# Patient Record
Sex: FEMALE | Race: WHITE | Employment: FULL TIME | URBAN - METROPOLITAN AREA
[De-identification: names, ages, dates, MRNs, and addresses within clinical notes are randomized per-mention and may not be internally consistent; named-entity substitution may affect disease eponyms.]

---

## 2018-10-08 ENCOUNTER — HOSPITAL ENCOUNTER (OUTPATIENT)
Age: 28
Discharge: HOME | End: 2018-10-10

## 2018-10-08 PROCEDURE — 87591 N.GONORRHOEAE DNA AMP PROB: CPT

## 2018-10-08 PROCEDURE — 88175 CYTOPATH C/V AUTO FLUID REDO: CPT

## 2018-10-08 PROCEDURE — 87491 CHLMYD TRACH DNA AMP PROBE: CPT

## 2018-10-11 LAB
CHLAMYDIA TRACHOMATIS AMPLIFIED DET: NORMAL
N GONORRHOEAE AMPLIFIED DET: NORMAL

## 2021-11-23 ENCOUNTER — HOSPITAL ENCOUNTER (OUTPATIENT)
Age: 31
Setting detail: OBSERVATION
Discharge: LEFT BEFORE DISCHARGE | End: 2021-11-25
Attending: OBSTETRICS & GYNECOLOGY | Admitting: OBSTETRICS & GYNECOLOGY
Payer: COMMERCIAL

## 2021-11-23 ENCOUNTER — ANCILLARY PROCEDURE (OUTPATIENT)
Dept: OBGYN CLINIC | Age: 31
End: 2021-11-23
Payer: COMMERCIAL

## 2021-11-23 PROBLEM — O16.3 ELEVATED BLOOD PRESSURE AFFECTING PREGNANCY IN THIRD TRIMESTER, ANTEPARTUM: Status: ACTIVE | Noted: 2021-11-23

## 2021-11-23 LAB
ABO/RH: NORMAL
ALBUMIN SERPL-MCNC: 3.6 G/DL (ref 3.5–5.2)
ALP BLD-CCNC: 110 U/L (ref 35–104)
ALT SERPL-CCNC: 54 U/L (ref 0–32)
AMPHETAMINE SCREEN, URINE: NOT DETECTED
ANION GAP SERPL CALCULATED.3IONS-SCNC: 14 MMOL/L (ref 7–16)
ANTIBODY SCREEN: NORMAL
APTT: 26.9 SEC (ref 24.5–35.1)
AST SERPL-CCNC: 36 U/L (ref 0–31)
BACTERIA: ABNORMAL /HPF
BARBITURATE SCREEN URINE: NOT DETECTED
BASOPHILS ABSOLUTE: 0.03 E9/L (ref 0–0.2)
BASOPHILS RELATIVE PERCENT: 0.3 % (ref 0–2)
BENZODIAZEPINE SCREEN, URINE: NOT DETECTED
BILIRUB SERPL-MCNC: 0.4 MG/DL (ref 0–1.2)
BILIRUBIN URINE: NEGATIVE
BLOOD, URINE: NEGATIVE
BUN BLDV-MCNC: 5 MG/DL (ref 6–20)
CALCIUM SERPL-MCNC: 9.3 MG/DL (ref 8.6–10.2)
CANNABINOID SCREEN URINE: NOT DETECTED
CHLORIDE BLD-SCNC: 101 MMOL/L (ref 98–107)
CLARITY: CLEAR
CO2: 20 MMOL/L (ref 22–29)
COCAINE METABOLITE SCREEN URINE: NOT DETECTED
COLOR: YELLOW
CREAT SERPL-MCNC: 0.7 MG/DL (ref 0.5–1)
EOSINOPHILS ABSOLUTE: 0.1 E9/L (ref 0.05–0.5)
EOSINOPHILS RELATIVE PERCENT: 1 % (ref 0–6)
EPITHELIAL CELLS, UA: ABNORMAL /HPF
FENTANYL SCREEN, URINE: NOT DETECTED
FIBRINOGEN: 668 MG/DL (ref 225–540)
GFR AFRICAN AMERICAN: >60
GFR NON-AFRICAN AMERICAN: >60 ML/MIN/1.73
GLUCOSE BLD-MCNC: 89 MG/DL (ref 74–99)
GLUCOSE URINE: NEGATIVE MG/DL
HCT VFR BLD CALC: 28.4 % (ref 34–48)
HEMOGLOBIN: 10 G/DL (ref 11.5–15.5)
IMMATURE GRANULOCYTES #: 0.06 E9/L
IMMATURE GRANULOCYTES %: 0.6 % (ref 0–5)
INR BLD: 0.9
KETONES, URINE: 15 MG/DL
LEUKOCYTE ESTERASE, URINE: NEGATIVE
LYMPHOCYTES ABSOLUTE: 1.98 E9/L (ref 1.5–4)
LYMPHOCYTES RELATIVE PERCENT: 19.8 % (ref 20–42)
Lab: NORMAL
MCH RBC QN AUTO: 32.5 PG (ref 26–35)
MCHC RBC AUTO-ENTMCNC: 35.2 % (ref 32–34.5)
MCV RBC AUTO: 92.2 FL (ref 80–99.9)
METHADONE SCREEN, URINE: NOT DETECTED
MONOCYTES ABSOLUTE: 0.72 E9/L (ref 0.1–0.95)
MONOCYTES RELATIVE PERCENT: 7.2 % (ref 2–12)
NEUTROPHILS ABSOLUTE: 7.11 E9/L (ref 1.8–7.3)
NEUTROPHILS RELATIVE PERCENT: 71.1 % (ref 43–80)
NITRITE, URINE: NEGATIVE
OPIATE SCREEN URINE: NOT DETECTED
OXYCODONE URINE: NOT DETECTED
PDW BLD-RTO: 13.2 FL (ref 11.5–15)
PH UA: 7 (ref 5–9)
PHENCYCLIDINE SCREEN URINE: NOT DETECTED
PLATELET # BLD: 223 E9/L (ref 130–450)
PMV BLD AUTO: 12.4 FL (ref 7–12)
POTASSIUM SERPL-SCNC: 3.2 MMOL/L (ref 3.5–5)
PROTEIN UA: NEGATIVE MG/DL
PROTHROMBIN TIME: 9.6 SEC (ref 9.3–12.4)
RBC # BLD: 3.08 E12/L (ref 3.5–5.5)
RBC UA: ABNORMAL /HPF (ref 0–2)
SARS-COV-2, NAAT: NOT DETECTED
SODIUM BLD-SCNC: 135 MMOL/L (ref 132–146)
SPECIFIC GRAVITY UA: <=1.005 (ref 1–1.03)
TOTAL PROTEIN: 6.4 G/DL (ref 6.4–8.3)
URIC ACID, SERUM: 3.9 MG/DL (ref 2.4–5.7)
UROBILINOGEN, URINE: 0.2 E.U./DL
WBC # BLD: 10 E9/L (ref 4.5–11.5)
WBC UA: ABNORMAL /HPF (ref 0–5)

## 2021-11-23 PROCEDURE — 76811 OB US DETAILED SNGL FETUS: CPT | Performed by: OBSTETRICS & GYNECOLOGY

## 2021-11-23 PROCEDURE — 96372 THER/PROPH/DIAG INJ SC/IM: CPT

## 2021-11-23 PROCEDURE — 96376 TX/PRO/DX INJ SAME DRUG ADON: CPT

## 2021-11-23 PROCEDURE — 85384 FIBRINOGEN ACTIVITY: CPT

## 2021-11-23 PROCEDURE — 84550 ASSAY OF BLOOD/URIC ACID: CPT

## 2021-11-23 PROCEDURE — 80053 COMPREHEN METABOLIC PANEL: CPT

## 2021-11-23 PROCEDURE — 96365 THER/PROPH/DIAG IV INF INIT: CPT

## 2021-11-23 PROCEDURE — 85025 COMPLETE CBC W/AUTO DIFF WBC: CPT

## 2021-11-23 PROCEDURE — 6360000002 HC RX W HCPCS: Performed by: OBSTETRICS & GYNECOLOGY

## 2021-11-23 PROCEDURE — 36415 COLL VENOUS BLD VENIPUNCTURE: CPT

## 2021-11-23 PROCEDURE — 80307 DRUG TEST PRSMV CHEM ANLYZR: CPT

## 2021-11-23 PROCEDURE — 87088 URINE BACTERIA CULTURE: CPT

## 2021-11-23 PROCEDURE — 6370000000 HC RX 637 (ALT 250 FOR IP): Performed by: OBSTETRICS & GYNECOLOGY

## 2021-11-23 PROCEDURE — 99203 OFFICE O/P NEW LOW 30 MIN: CPT | Performed by: OBSTETRICS & GYNECOLOGY

## 2021-11-23 PROCEDURE — 86850 RBC ANTIBODY SCREEN: CPT

## 2021-11-23 PROCEDURE — 86900 BLOOD TYPING SEROLOGIC ABO: CPT

## 2021-11-23 PROCEDURE — 81001 URINALYSIS AUTO W/SCOPE: CPT

## 2021-11-23 PROCEDURE — 85730 THROMBOPLASTIN TIME PARTIAL: CPT

## 2021-11-23 PROCEDURE — 76820 UMBILICAL ARTERY ECHO: CPT | Performed by: OBSTETRICS & GYNECOLOGY

## 2021-11-23 PROCEDURE — 85610 PROTHROMBIN TIME: CPT

## 2021-11-23 PROCEDURE — 87635 SARS-COV-2 COVID-19 AMP PRB: CPT

## 2021-11-23 PROCEDURE — 86901 BLOOD TYPING SEROLOGIC RH(D): CPT

## 2021-11-23 PROCEDURE — 2580000003 HC RX 258: Performed by: OBSTETRICS & GYNECOLOGY

## 2021-11-23 PROCEDURE — 96366 THER/PROPH/DIAG IV INF ADDON: CPT

## 2021-11-23 PROCEDURE — 76819 FETAL BIOPHYS PROFIL W/O NST: CPT | Performed by: OBSTETRICS & GYNECOLOGY

## 2021-11-23 PROCEDURE — 99213 OFFICE O/P EST LOW 20 MIN: CPT | Performed by: ADVANCED PRACTICE MIDWIFE

## 2021-11-23 RX ORDER — BETAMETHASONE SODIUM PHOSPHATE AND BETAMETHASONE ACETATE 3; 3 MG/ML; MG/ML
12 INJECTION, SUSPENSION INTRA-ARTICULAR; INTRALESIONAL; INTRAMUSCULAR; SOFT TISSUE ONCE
Status: COMPLETED | OUTPATIENT
Start: 2021-11-23 | End: 2021-11-23

## 2021-11-23 RX ORDER — MAGNESIUM SULFATE HEPTAHYDRATE 40 MG/ML
4000 INJECTION, SOLUTION INTRAVENOUS ONCE
Status: COMPLETED | OUTPATIENT
Start: 2021-11-23 | End: 2021-11-23

## 2021-11-23 RX ORDER — SODIUM CHLORIDE, SODIUM LACTATE, POTASSIUM CHLORIDE, CALCIUM CHLORIDE 600; 310; 30; 20 MG/100ML; MG/100ML; MG/100ML; MG/100ML
INJECTION, SOLUTION INTRAVENOUS CONTINUOUS
Status: DISCONTINUED | OUTPATIENT
Start: 2021-11-23 | End: 2021-11-25 | Stop reason: HOSPADM

## 2021-11-23 RX ORDER — LABETALOL 100 MG/1
50 TABLET, FILM COATED ORAL EVERY 12 HOURS SCHEDULED
Status: DISCONTINUED | OUTPATIENT
Start: 2021-11-23 | End: 2021-11-25

## 2021-11-23 RX ADMIN — BETAMETHASONE SODIUM PHOSPHATE AND BETAMETHASONE ACETATE 12 MG: 3; 3 INJECTION, SUSPENSION INTRA-ARTICULAR; INTRALESIONAL; INTRAMUSCULAR; SOFT TISSUE at 18:15

## 2021-11-23 RX ADMIN — MAGNESIUM SULFATE IN WATER 4000 MG: 40 INJECTION, SOLUTION INTRAVENOUS at 19:41

## 2021-11-23 RX ADMIN — MAGNESIUM SULFATE HEPTAHYDRATE 2000 MG/HR: 40 INJECTION, SOLUTION INTRAVENOUS at 20:22

## 2021-11-23 RX ADMIN — LABETALOL HYDROCHLORIDE 50 MG: 100 TABLET, FILM COATED ORAL at 22:12

## 2021-11-23 RX ADMIN — SODIUM CHLORIDE, POTASSIUM CHLORIDE, SODIUM LACTATE AND CALCIUM CHLORIDE: 600; 310; 30; 20 INJECTION, SOLUTION INTRAVENOUS at 21:49

## 2021-11-23 ASSESSMENT — PAIN SCALES - GENERAL: PAINLEVEL_OUTOF10: 0

## 2021-11-23 NOTE — H&P
CHIEF COMPLAINT:  elevated blood pressure    HISTORY OF PRESENT ILLNESS:      The patient is a 32 y.o. female at 27w9d. OB History        1    Para        Term                AB        Living           SAB        IAB        Ectopic        Molar        Multiple        Live Births                Patient presents with a chief complaint as above and is being admitted for gestational hypertension rule out pre-eclampsia    Estimated Due Date: Estimated Date of Delivery: 22    PRENATAL CARE:    Complicated by: chronic hypertension    PAST OB HISTORY  OB History        1    Para        Term                AB        Living           SAB        IAB        Ectopic        Molar        Multiple        Live Births                    Past Medical History:    No past medical history on file. Past Surgical History:    No past surgical history on file. Allergies:  Patient has no known allergies. Social History:    Social History     Socioeconomic History    Marital status: Single     Spouse name: Not on file    Number of children: Not on file    Years of education: Not on file    Highest education level: Not on file   Occupational History    Not on file   Tobacco Use    Smoking status: Current Every Day Smoker     Packs/day: 0.50    Smokeless tobacco: Never Used   Substance and Sexual Activity    Alcohol use: No    Drug use: No    Sexual activity: Yes   Other Topics Concern    Not on file   Social History Narrative    Not on file     Social Determinants of Health     Financial Resource Strain:     Difficulty of Paying Living Expenses: Not on file   Food Insecurity:     Worried About Running Out of Food in the Last Year: Not on file    Jonny of Food in the Last Year: Not on file   Transportation Needs:     Lack of Transportation (Medical): Not on file    Lack of Transportation (Non-Medical):  Not on file   Physical Activity:     Days of Exercise per Week: Not on file    Minutes of Exercise per Session: Not on file   Stress:     Feeling of Stress : Not on file   Social Connections:     Frequency of Communication with Friends and Family: Not on file    Frequency of Social Gatherings with Friends and Family: Not on file    Attends [employer] Services: Not on file    Active Member of Clubs or Organizations: Not on file    Attends Club or Organization Meetings: Not on file    Marital Status: Not on file   Intimate Partner Violence:     Fear of Current or Ex-Partner: Not on file    Emotionally Abused: Not on file    Physically Abused: Not on file    Sexually Abused: Not on file   Housing Stability:     Unable to Pay for Housing in the Last Year: Not on file    Number of [place] in the Last Year: Not on file    Unstable Housing in the Last Year: Not on file     Family History:   No family history on file. Medications Prior to Admission:  Medications Prior to Admission: Prenat-FeAsp-Meth-FA-DHA w/o A (PRENATE DHA) 18-0.6-0.4-300 MG CAPS, Take 1 tablet by mouth daily    REVIEW OF SYSTEMS:    CONSTITUTIONAL:  negative  RESPIRATORY:  negative  CARDIOVASCULAR:  negative  GASTROINTESTINAL:  negative  ALLERGIC/IMMUNOLOGIC:  negative  NEUROLOGICAL:  negative  BEHAVIOR/PSYCH:  negative    PHYSICAL EXAM:  Vitals:    11/23/21 1658 11/23/21 1757   BP: (!) 144/87 (!) 158/86   Pulse: 88 96   Resp: 16    Temp: 98.6 °F (37 °C)    TempSrc: Oral      General appearance:  awake, alert, cooperative, no apparent distress, and appears stated age  Neurologic:  Awake, alert, oriented to name, place and time. Lungs:  No increased work of breathing, good air exchange  Abdomen:  Soft, non tender, gravid, consistent with her gestational age,  Fetal heart rate:  Reassuring.   Pelvis:  Adequate pelvis  Cervix: deferred  Contraction frequency:  none    Membranes:  Intact    ASSESSMENT AND PLAN:      Other: Dr Turner Aragon called in with orders  MFM consult, pi labs urine, celestone

## 2021-11-23 NOTE — CONSULTS
Fredy Alcantar MD  [address] [facility], [address] [place] [address]     RE:  Tiny Liao  : 1990   AGE: 32 y.o. This report has been created using voice recognition software. It may contain errors which are inherent in voice recognition technology. Dear Dr. Ally Burgos:    Dallis Harada had a consultation today for the following indications:    Patient Active Problem List   Diagnosis    Elevated blood pressure affecting pregnancy in third trimester, antepartum    30 weeks gestation of pregnancy       Dallis Harada is a 32 y.o. female, who is G1(0). She has an Estimated Date of Delivery: 22 based on her previous ultrasound assessment. She is currently 30 weeks 6 days gestation based on that assessment. The patient was admitted for evaluation and management from Dr. Sunshine Romero office secondary to hypertension. The patient stated that her blood pressure in the office was 168/109. She had no prior history of hypertension prior to pregnancy. She stated that her blood pressures have been elevated for approximately 1 month but more more significantly elevated today. She had no symptoms of hypertension. She has had no vaginal bleeding or leaking of amniotic fluid. She had no complaint of abdominal pain or tenderness. She has had increased amount of lower extremity edema in the past month. The fetal heart rate tracing has been reassuring with moderate variability and accelerations. The patient recently traveled to [facility].  She stated that she had a negative Covid antibody screen when returning to the [place]. The patient's liver enzymes were slightly elevated today. Her platelet count was within normal limits. She has no history of liver disease. Her highest recorded blood pressure in the hospital was 158/88. She had no symptomatology related to hypertension.     A fetal ultrasound evaluation was performed and a detailed report included in the EMR under the cervical LEEP / conization /cryosurgery. Negative for uterine surgery. Negative for ovarian or tubal surgery. No past medical history on file. No past surgical history on file. No Known Allergies      Current Facility-Administered Medications:     magnesium sulfate 4000 mg in 100 mL IVPB premix, 4,000 mg, IntraVENous, Once, Daleen Brittle, MD    magnesium sulfate (25601 mg/500mL infusion), 2,000 mg/hr, IntraVENous, Continuous, Daleen Brittle, MD    Social History     Tobacco Use    Smoking status: Current Every Day Smoker     Packs/day: 0.50    Smokeless tobacco: Never Used   Substance Use Topics    Alcohol use: No       FAMILY MEDICAL HISTORY:   Negative for congenital abnormalities, autism, genetic disease and mental retardation, not listed above. Review of Systems :   CONSTITUTIONAL : No fever, no chills   HEENT : No headache, no visual changes, no rhinorrhea, no sore throat   CARDIOVASCULAR : No pain, no palpitations, no edema   RESPIRATORY : No pain, no shortness of breath   GASTROINTESTINAL : No N/V, no D/C, no abdominal pain   GENITOURINARY : No dysuria, hematuria and no incontinence   MUSCULOSKELETAL : No myalgia, No back pain  NEUROLOGICAL : No numbness, no tingling, no tremors. No history of seizures  ALL OTHER SYSTEMS WERE REPORTED AS NEGATIVE. PERTINENT PHYSICAL EXAMINATION:   Patient Vitals for the past 24 hrs:   BP Temp Temp src Pulse Resp   11/23/21 1828 (!) 146/87 -- -- 87 --   11/23/21 1812 (!) 150/88 -- -- 90 --   11/23/21 1757 (!) 158/86 -- -- 96 --   11/23/21 1658 (!) 144/87 98.6 °F (37 °C) Oral 88 16     GENERAL:   The patient is a well developed, female who is alert cooperative and oriented times three in no acute distress. HEENT:  Normo cephalic and atraumatic. No facial edema. ABDOMEN:   Her uterus is gravid. She had no complaint of abdominal pain or tenderness.  The fetal heart rate is 148 bpm. The fetus is in the cephalic presentation which was confirmed by the ultrasound assessment. EXTREMITIES:  1+ peripheral edema is noted.        Admission on 11/23/2021   Component Date Value Ref Range Status    aPTT 11/23/2021 26.9  24.5 - 35.1 sec Final    Protime 11/23/2021 9.6  9.3 - 12.4 sec Final    INR 11/23/2021 0.9   Final    Fibrinogen 11/23/2021 668* 225 - 540 mg/dL Final    Sodium 11/23/2021 135  132 - 146 mmol/L Final    Potassium 11/23/2021 3.2* 3.5 - 5.0 mmol/L Final    Chloride 11/23/2021 101  98 - 107 mmol/L Final    CO2 11/23/2021 20* 22 - 29 mmol/L Final    Anion Gap 11/23/2021 14  7 - 16 mmol/L Final    Glucose 11/23/2021 89  74 - 99 mg/dL Final    BUN 11/23/2021 5* 6 - 20 mg/dL Final    CREATININE 11/23/2021 0.7  0.5 - 1.0 mg/dL Final    GFR Non- 11/23/2021 >60  >=60 mL/min/1.73 Final    GFR  11/23/2021 >60   Final    Calcium 11/23/2021 9.3  8.6 - 10.2 mg/dL Final    Total Protein 11/23/2021 6.4  6.4 - 8.3 g/dL Final    Albumin 11/23/2021 3.6  3.5 - 5.2 g/dL Final    Total Bilirubin 11/23/2021 0.4  0.0 - 1.2 mg/dL Final    Alkaline Phosphatase 11/23/2021 110* 35 - 104 U/L Final    ALT 11/23/2021 54* 0 - 32 U/L Final    AST 11/23/2021 36* 0 - 31 U/L Final    WBC 11/23/2021 10.0  4.5 - 11.5 E9/L Final    RBC 11/23/2021 3.08* 3.50 - 5.50 E12/L Final    Hemoglobin 11/23/2021 10.0* 11.5 - 15.5 g/dL Final    Hematocrit 11/23/2021 28.4* 34.0 - 48.0 % Final    MCV 11/23/2021 92.2  80.0 - 99.9 fL Final    MCH 11/23/2021 32.5  26.0 - 35.0 pg Final    MCHC 11/23/2021 35.2* 32.0 - 34.5 % Final    RDW 11/23/2021 13.2  11.5 - 15.0 fL Final    Platelets 06/14/1626 223  130 - 450 E9/L Final    MPV 11/23/2021 12.4* 7.0 - 12.0 fL Final    Neutrophils % 11/23/2021 71.1  43.0 - 80.0 % Final    Immature Granulocytes % 11/23/2021 0.6  0.0 - 5.0 % Final    Lymphocytes % 11/23/2021 19.8* 20.0 - 42.0 % Final    Monocytes % 11/23/2021 7.2  2.0 - 12.0 % Final    Eosinophils % 11/23/2021 1.0  0.0 - 6.0 % Final    Basophils % 11/23/2021 0.3  0.0 - 2.0 % Final    Neutrophils Absolute 11/23/2021 7.11  1.80 - 7.30 E9/L Final    Immature Granulocytes # 11/23/2021 0.06  E9/L Final    Lymphocytes Absolute 11/23/2021 1.98  1.50 - 4.00 E9/L Final    Monocytes Absolute 11/23/2021 0.72  0.10 - 0.95 E9/L Final    Eosinophils Absolute 11/23/2021 0.10  0.05 - 0.50 E9/L Final    Basophils Absolute 11/23/2021 0.03  0.00 - 0.20 E9/L Final    Color, UA 11/23/2021 Yellow  Straw/Yellow Final    Clarity, UA 11/23/2021 Clear  Clear Final    Glucose, Ur 11/23/2021 Negative  Negative mg/dL Final    Bilirubin Urine 11/23/2021 Negative  Negative Final    Ketones, Urine 11/23/2021 15* Negative mg/dL Final    Specific [place], UA 11/23/2021 <=1.005  1.005 - 1.030 Final    Blood, Urine 11/23/2021 Negative  Negative Final    pH, UA 11/23/2021 7.0  5.0 - 9.0 Final    Protein, UA 11/23/2021 Negative  Negative mg/dL Final    Urobilinogen, Urine 11/23/2021 0.2  <2.0 E.U./dL Final    Nitrite, Urine 11/23/2021 Negative  Negative Final    Leukocyte Esterase, Urine 11/23/2021 Negative  Negative Final    WBC, UA 11/23/2021 1-3  0 - 5 /HPF Final    RBC, UA 11/23/2021 NONE  0 - 2 /HPF Final    Epithelial Cells, UA 11/23/2021 FEW  /HPF Final    Bacteria, UA 11/23/2021 MODERATE* None Seen /HPF Final    Amphetamine Screen, Urine 11/23/2021 NOT DETECTED  Negative <1000 ng/mL Final    Barbiturate Screen, Ur 11/23/2021 NOT DETECTED  Negative < 200 ng/mL Final    Benzodiazepine Screen, Urine 11/23/2021 NOT DETECTED  Negative < 200 ng/mL Final    Cannabinoid Scrn, Ur 11/23/2021 NOT DETECTED  Negative < 50ng/mL Final    Cocaine Metabolite Screen, Urine 11/23/2021 NOT DETECTED  Negative < 300 ng/mL Final    Opiate Scrn, Ur 11/23/2021 NOT DETECTED  Negative < 300ng/mL Final    PCP Screen, Urine 11/23/2021 NOT DETECTED  Negative < 25 ng/mL Final    Methadone Screen, Urine 11/23/2021 NOT DETECTED  Negative <300 ng/mL Final    Oxycodone Urine 11/23/2021 NOT DETECTED  Negative <100 ng/mL Final    FENTANYL SCREEN, URINE 11/23/2021 NOT DETECTED  Negative <1 ng/mL Final    Drug Screen Comment: 11/23/2021 see below   Final    Uric Acid, Serum 11/23/2021 3.9  2.4 - 5.7 mg/dL Final       IMPRESSION:  1.  IUP at 30 weeks 6 days gestation based on her Estimated Date of Delivery: 1/26/22  2. Elevated blood pressure in the third trimester with no prior history of HTN  3. Possible urinary tract infection. Culture pending. 4.  Elevated liver enzymes 11/23/2021  5. Not vaccinated for COVID-19  6. [facility]  7. Taking 81 mg ASA daily  8. Category 1 FHR tracing    PLAN:  The patient is currently admitted for evaluation and management. Continuous fetal heart rate and uterine contraction monitoring should be utilized for now. DVT prophylaxis should be utilized throughout her admission. Celestone is being administered for acceleration of fetal organ maturity. Magnesium sulfate is being administered for fetal neuro protection. Laboratory testing will be repeated in the morning including a CMP, CBC, uric acid, APTT, PT/INR, fibrinogen, acute hepatitis panel, COVID-19 screen, and urine culture. Consultation with neonatology was requested. Further evaluation and management will be dependent on the results of the patient's testing and her clinical presentation. If you have any questions regarding her management, please contact me at your convenience and thank you for allowing me to participate in her care.     Sincerely,        Julianne Arechiga MD, MS, Madi Childress, CIARA, RDMS, RVT  Director [address] [place] [address]  [phone]

## 2021-11-24 ENCOUNTER — ANCILLARY PROCEDURE (OUTPATIENT)
Dept: OBGYN CLINIC | Age: 31
End: 2021-11-24
Payer: COMMERCIAL

## 2021-11-24 PROBLEM — Z34.90 NORMAL PREGNANCY, UNSPECIFIED TRIMESTER: Status: ACTIVE | Noted: 2021-11-24

## 2021-11-24 LAB
ALBUMIN SERPL-MCNC: 3.4 G/DL (ref 3.5–5.2)
ALP BLD-CCNC: 110 U/L (ref 35–104)
ALT SERPL-CCNC: 51 U/L (ref 0–32)
ANION GAP SERPL CALCULATED.3IONS-SCNC: 14 MMOL/L (ref 7–16)
APTT: 25.3 SEC (ref 24.5–35.1)
AST SERPL-CCNC: 38 U/L (ref 0–31)
BASOPHILS ABSOLUTE: 0.01 E9/L (ref 0–0.2)
BASOPHILS RELATIVE PERCENT: 0.1 % (ref 0–2)
BILIRUB SERPL-MCNC: 0.4 MG/DL (ref 0–1.2)
BUN BLDV-MCNC: 3 MG/DL (ref 6–20)
CALCIUM SERPL-MCNC: 7.9 MG/DL (ref 8.6–10.2)
CHLORIDE BLD-SCNC: 100 MMOL/L (ref 98–107)
CO2: 19 MMOL/L (ref 22–29)
CREAT SERPL-MCNC: 0.7 MG/DL (ref 0.5–1)
EOSINOPHILS ABSOLUTE: 0 E9/L (ref 0.05–0.5)
EOSINOPHILS RELATIVE PERCENT: 0 % (ref 0–6)
FIBRINOGEN: 667 MG/DL (ref 225–540)
GFR AFRICAN AMERICAN: >60
GFR NON-AFRICAN AMERICAN: >60 ML/MIN/1.73
GLUCOSE BLD-MCNC: 122 MG/DL (ref 74–99)
HAV IGM SER IA-ACNC: NORMAL
HCT VFR BLD CALC: 28.2 % (ref 34–48)
HEMOGLOBIN: 9.7 G/DL (ref 11.5–15.5)
HEPATITIS B CORE IGM ANTIBODY: NORMAL
HEPATITIS B SURFACE ANTIGEN INTERPRETATION: NORMAL
HEPATITIS C ANTIBODY INTERPRETATION: NORMAL
IMMATURE GRANULOCYTES #: 0.09 E9/L
IMMATURE GRANULOCYTES %: 0.6 % (ref 0–5)
INR BLD: 0.8
LYMPHOCYTES ABSOLUTE: 1.5 E9/L (ref 1.5–4)
LYMPHOCYTES RELATIVE PERCENT: 10.5 % (ref 20–42)
MCH RBC QN AUTO: 32.7 PG (ref 26–35)
MCHC RBC AUTO-ENTMCNC: 34.4 % (ref 32–34.5)
MCV RBC AUTO: 94.9 FL (ref 80–99.9)
MONOCYTES ABSOLUTE: 0.44 E9/L (ref 0.1–0.95)
MONOCYTES RELATIVE PERCENT: 3.1 % (ref 2–12)
NEUTROPHILS ABSOLUTE: 12.3 E9/L (ref 1.8–7.3)
NEUTROPHILS RELATIVE PERCENT: 85.7 % (ref 43–80)
PDW BLD-RTO: 13.2 FL (ref 11.5–15)
PLATELET # BLD: 229 E9/L (ref 130–450)
PMV BLD AUTO: 12.8 FL (ref 7–12)
POTASSIUM SERPL-SCNC: 3.1 MMOL/L (ref 3.5–5)
PROTHROMBIN TIME: 9.2 SEC (ref 9.3–12.4)
RBC # BLD: 2.97 E12/L (ref 3.5–5.5)
SODIUM BLD-SCNC: 133 MMOL/L (ref 132–146)
TOTAL PROTEIN: 6 G/DL (ref 6.4–8.3)
URIC ACID, SERUM: 4.5 MG/DL (ref 2.4–5.7)
WBC # BLD: 14.3 E9/L (ref 4.5–11.5)

## 2021-11-24 PROCEDURE — G0378 HOSPITAL OBSERVATION PER HR: HCPCS

## 2021-11-24 PROCEDURE — 96365 THER/PROPH/DIAG IV INF INIT: CPT

## 2021-11-24 PROCEDURE — 76819 FETAL BIOPHYS PROFIL W/O NST: CPT | Performed by: OBSTETRICS & GYNECOLOGY

## 2021-11-24 PROCEDURE — 85384 FIBRINOGEN ACTIVITY: CPT

## 2021-11-24 PROCEDURE — 85610 PROTHROMBIN TIME: CPT

## 2021-11-24 PROCEDURE — 96366 THER/PROPH/DIAG IV INF ADDON: CPT

## 2021-11-24 PROCEDURE — 80074 ACUTE HEPATITIS PANEL: CPT

## 2021-11-24 PROCEDURE — 85730 THROMBOPLASTIN TIME PARTIAL: CPT

## 2021-11-24 PROCEDURE — 99212 OFFICE O/P EST SF 10 MIN: CPT | Performed by: OBSTETRICS & GYNECOLOGY

## 2021-11-24 PROCEDURE — 6370000000 HC RX 637 (ALT 250 FOR IP): Performed by: OBSTETRICS & GYNECOLOGY

## 2021-11-24 PROCEDURE — 96372 THER/PROPH/DIAG INJ SC/IM: CPT

## 2021-11-24 PROCEDURE — 84550 ASSAY OF BLOOD/URIC ACID: CPT

## 2021-11-24 PROCEDURE — 85025 COMPLETE CBC W/AUTO DIFF WBC: CPT

## 2021-11-24 PROCEDURE — 36415 COLL VENOUS BLD VENIPUNCTURE: CPT

## 2021-11-24 PROCEDURE — 80053 COMPREHEN METABOLIC PANEL: CPT

## 2021-11-24 PROCEDURE — 6360000002 HC RX W HCPCS: Performed by: OBSTETRICS & GYNECOLOGY

## 2021-11-24 PROCEDURE — 76820 UMBILICAL ARTERY ECHO: CPT | Performed by: OBSTETRICS & GYNECOLOGY

## 2021-11-24 RX ORDER — ASPIRIN 81 MG/1
81 TABLET, CHEWABLE ORAL DAILY
Status: ON HOLD | COMMUNITY
End: 2022-01-08 | Stop reason: HOSPADM

## 2021-11-24 RX ORDER — BETAMETHASONE SODIUM PHOSPHATE AND BETAMETHASONE ACETATE 3; 3 MG/ML; MG/ML
12 INJECTION, SUSPENSION INTRA-ARTICULAR; INTRALESIONAL; INTRAMUSCULAR; SOFT TISSUE ONCE
Status: COMPLETED | OUTPATIENT
Start: 2021-11-24 | End: 2021-11-24

## 2021-11-24 RX ADMIN — BETAMETHASONE SODIUM PHOSPHATE AND BETAMETHASONE ACETATE 12 MG: 3; 3 INJECTION, SUSPENSION INTRA-ARTICULAR; INTRALESIONAL; INTRAMUSCULAR at 18:56

## 2021-11-24 RX ADMIN — LABETALOL HYDROCHLORIDE 50 MG: 100 TABLET, FILM COATED ORAL at 20:40

## 2021-11-24 RX ADMIN — LABETALOL HYDROCHLORIDE 50 MG: 100 TABLET, FILM COATED ORAL at 08:32

## 2021-11-24 RX ADMIN — MAGNESIUM SULFATE HEPTAHYDRATE 2000 MG/HR: 40 INJECTION, SOLUTION INTRAVENOUS at 03:12

## 2021-11-24 NOTE — PROGRESS NOTES
. 31W0D. Patient denies VB, LOF, and contractions. Patient reports good fetal movement. Denies any other complaints. EFM on. Call light within reach. Will continue to monitor.

## 2021-11-24 NOTE — H&P
CHIEF COMPLAINT:  elevated blood pressure    HISTORY OF PRESENT ILLNESS:      The patient is a 32 y.o. female at 31w0d. OB History        1    Para        Term                AB        Living           SAB        IAB        Ectopic        Molar        Multiple        Live Births                Patient presents with a chief complaint as above and is being admitted for gestational hypertension r/o pih     Estimated Due Date: Estimated Date of Delivery: 22    PRENATAL CARE:    Complicated by: gestational hypertension    PAST OB HISTORY  OB History        1    Para        Term                AB        Living           SAB        IAB        Ectopic        Molar        Multiple        Live Births                    Past Medical History:    No past medical history on file. Past Surgical History:    No past surgical history on file. Allergies:  Patient has no known allergies. Social History:    Social History     Socioeconomic History    Marital status: Single     Spouse name: Not on file    Number of children: Not on file    Years of education: Not on file    Highest education level: Not on file   Occupational History    Not on file   Tobacco Use    Smoking status: Current Every Day Smoker     Packs/day: 0.50    Smokeless tobacco: Never Used   Substance and Sexual Activity    Alcohol use: No    Drug use: No    Sexual activity: Yes   Other Topics Concern    Not on file   Social History Narrative    Not on file     Social Determinants of Health     Financial Resource Strain:     Difficulty of Paying Living Expenses: Not on file   Food Insecurity:     Worried About Running Out of Food in the Last Year: Not on file    Jonny of Food in the Last Year: Not on file   Transportation Needs:     Lack of Transportation (Medical): Not on file    Lack of Transportation (Non-Medical):  Not on file   Physical Activity:     Days of Exercise per Week: Not on file    Minutes of Exercise per Session: Not on file   Stress:     Feeling of Stress : Not on file   Social Connections:     Frequency of Communication with Friends and Family: Not on file    Frequency of Social Gatherings with Friends and Family: Not on file    Attends Taoism Services: Not on file    Active Member of Clubs or Organizations: Not on file    Attends Club or Organization Meetings: Not on file    Marital Status: Not on file   Intimate Partner Violence:     Fear of Current or Ex-Partner: Not on file    Emotionally Abused: Not on file    Physically Abused: Not on file    Sexually Abused: Not on file   Housing Stability:     Unable to Pay for Housing in the Last Year: Not on file    Number of [place] in the Last Year: Not on file    Unstable Housing in the Last Year: Not on file     Family History:   No family history on file. Medications Prior to Admission:  Medications Prior to Admission: aspirin 81 MG chewable tablet, Take 81 mg by mouth daily  Prenat-FeAsp-Meth-FA-DHA w/o A (PRENATE DHA) 18-0.6-0.4-300 MG CAPS, Take 1 tablet by mouth daily    REVIEW OF SYSTEMS:    CONSTITUTIONAL:  negative  RESPIRATORY:  negative  CARDIOVASCULAR:  negative  GASTROINTESTINAL:  negative  ALLERGIC/IMMUNOLOGIC:  negative  NEUROLOGICAL:  negative  BEHAVIOR/PSYCH:  negative    PHYSICAL EXAM:  Vitals:    11/24/21 0450 11/24/21 0550 11/24/21 0600 11/24/21 0650   BP: 135/75 (!) 141/89  (!) 142/80   Pulse: 103 104  104   Resp:  12     Temp:  97.7 °F (36.5 °C)     TempSrc:  Oral     SpO2: 96% 98%  96%   Weight:   194 lb (88 kg)    Height:   5' 7\" (1.702 m)      General appearance:  awake, alert, cooperative, no apparent distress, and appears stated age  Neurologic:  Awake, alert, oriented to name, place and time. Lungs:  No increased work of breathing, good air exchange  Abdomen:  Soft, non tender, gravid, consistent with her gestational age, EFW by   Fetal heart rate:  Reassuring.   Pelvis:  Adequate pelvis  Cervix: Closed Long firm -4  Contraction frequency:  0 minutes    Membranes:  Intact    ASSESSMENT AND PLAN:    Labor: Admit, blood work , routine labor orders  Fetus: Reassuring  GBS: na  Other: IV hydration and antiemetics  mfm   Elevated liver enzymes   covid testing   Steroids   mgso4

## 2021-11-24 NOTE — PROGRESS NOTES
Assumed care of pt. Pt reports 0/10 pain, no LOF, bleeding or ctx. States pos. FM. Adjusted EFM. Call light within reach.

## 2021-11-25 ENCOUNTER — ANCILLARY PROCEDURE (OUTPATIENT)
Dept: OBGYN CLINIC | Age: 31
End: 2021-11-25
Payer: COMMERCIAL

## 2021-11-25 VITALS
TEMPERATURE: 97.9 F | HEART RATE: 101 BPM | HEIGHT: 67 IN | OXYGEN SATURATION: 99 % | BODY MASS INDEX: 30.45 KG/M2 | SYSTOLIC BLOOD PRESSURE: 146 MMHG | DIASTOLIC BLOOD PRESSURE: 85 MMHG | RESPIRATION RATE: 15 BRPM | WEIGHT: 194 LBS

## 2021-11-25 LAB
ALBUMIN SERPL-MCNC: 3.3 G/DL (ref 3.5–5.2)
ALP BLD-CCNC: 99 U/L (ref 35–104)
ALT SERPL-CCNC: 59 U/L (ref 0–32)
ANION GAP SERPL CALCULATED.3IONS-SCNC: 13 MMOL/L (ref 7–16)
AST SERPL-CCNC: 35 U/L (ref 0–31)
BILIRUB SERPL-MCNC: 0.2 MG/DL (ref 0–1.2)
BUN BLDV-MCNC: 4 MG/DL (ref 6–20)
CALCIUM SERPL-MCNC: 9.1 MG/DL (ref 8.6–10.2)
CHLORIDE BLD-SCNC: 104 MMOL/L (ref 98–107)
CO2: 21 MMOL/L (ref 22–29)
CREAT SERPL-MCNC: 0.7 MG/DL (ref 0.5–1)
GFR AFRICAN AMERICAN: >60
GFR NON-AFRICAN AMERICAN: >60 ML/MIN/1.73
GLUCOSE BLD-MCNC: 119 MG/DL (ref 74–99)
HCT VFR BLD CALC: 26.7 % (ref 34–48)
HEMOGLOBIN: 9.2 G/DL (ref 11.5–15.5)
MCH RBC QN AUTO: 32.3 PG (ref 26–35)
MCHC RBC AUTO-ENTMCNC: 34.5 % (ref 32–34.5)
MCV RBC AUTO: 93.7 FL (ref 80–99.9)
PDW BLD-RTO: 13.3 FL (ref 11.5–15)
PLATELET # BLD: 220 E9/L (ref 130–450)
PMV BLD AUTO: 12.3 FL (ref 7–12)
POTASSIUM SERPL-SCNC: 3.3 MMOL/L (ref 3.5–5)
RBC # BLD: 2.85 E12/L (ref 3.5–5.5)
SODIUM BLD-SCNC: 138 MMOL/L (ref 132–146)
TOTAL PROTEIN: 5.8 G/DL (ref 6.4–8.3)
WBC # BLD: 14.4 E9/L (ref 4.5–11.5)

## 2021-11-25 PROCEDURE — 76819 FETAL BIOPHYS PROFIL W/O NST: CPT | Performed by: OBSTETRICS & GYNECOLOGY

## 2021-11-25 PROCEDURE — 80053 COMPREHEN METABOLIC PANEL: CPT

## 2021-11-25 PROCEDURE — 6370000000 HC RX 637 (ALT 250 FOR IP): Performed by: OBSTETRICS & GYNECOLOGY

## 2021-11-25 PROCEDURE — 85027 COMPLETE CBC AUTOMATED: CPT

## 2021-11-25 PROCEDURE — G0378 HOSPITAL OBSERVATION PER HR: HCPCS

## 2021-11-25 PROCEDURE — 76820 UMBILICAL ARTERY ECHO: CPT | Performed by: OBSTETRICS & GYNECOLOGY

## 2021-11-25 PROCEDURE — 36415 COLL VENOUS BLD VENIPUNCTURE: CPT

## 2021-11-25 RX ORDER — LABETALOL 100 MG/1
100 TABLET, FILM COATED ORAL EVERY 12 HOURS SCHEDULED
Qty: 60 TABLET | Refills: 0 | Status: SHIPPED | OUTPATIENT
Start: 2021-11-25 | End: 2021-12-17

## 2021-11-25 RX ORDER — LABETALOL 100 MG/1
100 TABLET, FILM COATED ORAL EVERY 12 HOURS SCHEDULED
Status: DISCONTINUED | OUTPATIENT
Start: 2021-11-25 | End: 2021-11-25 | Stop reason: HOSPADM

## 2021-11-25 RX ADMIN — LABETALOL HYDROCHLORIDE 50 MG: 100 TABLET, FILM COATED ORAL at 09:03

## 2021-11-25 NOTE — PROGRESS NOTES
Dr Jaymie Houston updated on blood pressures over the last 24 hours, and morning lab work. Pt to stay overnight repeat CBC and CMP in the AM. Increase labetalol to 100mg.

## 2021-11-25 NOTE — PROGRESS NOTES
elevated today. Her platelet count was within normal limits. She has no history of liver disease. Her highest recorded blood pressure in the hospital was 158/88. She had no symptomatology related to hypertension. A fetal ultrasound evaluation was performed on 11/23/2021. A detailed report included in the EMR under the imaging tab from today's date. A living borrego intrauterine fetus was identified in the cephalic presentation, with normal fetal heart motion and normal fetal motion noted. The amniotic fluid index was 14.1 cm. The placenta was posterior. Visualization of the fetal anatomy was somewhat limited secondary to the fetal position. No apparent gross fetal anatomic abnormalities were identified in the areas which were visualized. The estimated fetal weight was at the 70th growth percentile for gestational age. The biophysical profile and cord Doppler studies were both reassuring. There was no evidence of absence, or reversal of end-diastolic flow. The ANDREA today was 16.9 cm. The fetus was in the cephalic presentation. The BPP and cord Doppler assessments were both reassuring. There was no evidence of absence, or reversal of end-diastolic flow.                    GENETIC SCREENING/TERATOLOGY COUNSELING              (Includes patient, FTB, and any affected family members)    Patient Age > 35 Years NO   Thalassemia ( MVC<80) NO   Congential Heart Defect NO   Neural Tube Defect NO   Mainor-Sachs NO   Sickle Cell Disease NO   Sickle Cell Trait NO   Sickle C Disease or Trait NO   Hemophilia NO   Muscular Dystrophy NO   Cystic Fibrosis NO   [place] Disease NO   Autism NO   Mental Retardation NO   History of Fragile X NO   Maternal Diabetes NO   Other Genetic Disease or Syndrome NO   Previous Child With Congenital Abnormality Not Listed NO   Recreational Drugs: Marijuana use YES                                                                 INFECTION HISTORY     HEPATITIS IMMUNIZED:  NO tremors. No history of seizures  ALL OTHER SYSTEMS WERE REPORTED AS NEGATIVE.     PERTINENT PHYSICAL EXAMINATION:   Patient Vitals for the past 24 hrs:   BP Temp Temp src Pulse Resp SpO2 Height Weight   11/24/21 1931 135/89 -- -- 108 -- -- -- --   11/24/21 1831 (!) 142/79 -- -- 105 -- -- -- --   11/24/21 1731 (!) 147/86 -- -- 103 -- -- -- --   11/24/21 1631 (!) 141/84 -- -- 96 -- -- -- --   11/24/21 1531 (!) 141/81 -- -- 96 -- -- -- --   11/24/21 1431 137/80 -- -- 105 -- -- -- --   11/24/21 1331 (!) 147/85 -- -- 102 -- -- -- --   11/24/21 1232 (!) 157/83 -- -- 110 -- -- -- --   11/24/21 1131 (!) 140/84 -- -- 123 -- -- -- --   11/24/21 1031 (!) 140/79 -- -- 97 -- -- -- --   11/24/21 0904 (!) 144/89 -- -- 115 -- -- -- --   11/24/21 0802 (!) 142/83 97.8 °F (36.6 °C) Oral 111 16 97 % -- --   11/24/21 0650 (!) 142/80 -- -- 104 -- 96 % -- --   11/24/21 0600 -- -- -- -- -- -- 5' 7\" (1.702 m) 194 lb (88 kg)   11/24/21 0550 (!) 141/89 97.7 °F (36.5 °C) Oral 104 12 98 % -- --   11/24/21 0450 135/75 -- -- 103 -- 96 % -- --   11/24/21 0351 136/76 -- -- 97 -- 98 % -- --   11/24/21 0150 (!) 141/79 -- -- 103 -- 93 % -- --   11/24/21 0050 131/72 -- -- 100 -- 93 % -- --   11/23/21 2345 138/77 97.9 °F (36.6 °C) Oral 96 12 97 % -- --   11/23/21 2200 -- -- -- -- -- 99 % -- --   11/23/21 2145 -- -- -- -- -- 97 % -- --   11/23/21 2140 (!) 161/90 -- -- 96 -- 97 % -- --   11/23/21 2135 -- -- -- -- -- 98 % -- --   11/23/21 2130 -- -- -- -- -- 97 % -- --   11/23/21 2125 -- -- -- -- -- 98 % -- --   11/23/21 2120 -- -- -- -- -- 99 % -- --   11/23/21 2115 -- -- -- -- -- 98 % -- --   11/23/21 2110 -- -- -- -- -- 98 % -- --   11/23/21 2105 -- -- -- -- -- 98 % -- --   11/23/21 2100 -- -- -- -- -- 98 % -- --   11/23/21 2055 (!) 159/87 -- -- 95 -- 99 % -- --   11/23/21 2050 -- -- -- -- -- 98 % -- --   11/23/21 2045 -- -- -- 101 -- 99 % -- --   11/23/21 2040 (!) 165/95 -- -- -- -- 99 % -- --   11/23/21 2035 -- -- -- -- -- 97 % -- --   11/23/21 2030 -- -- -- 107 -- 99 % -- --   11/23/21 2025 (!) 143/84 -- -- -- -- 99 % -- --   11/23/21 2020 -- -- -- -- -- 98 % -- --   11/23/21 2015 -- -- -- -- -- 99 % -- --   11/23/21 2010 (!) 155/89 -- -- 107 -- 98 % -- --   11/23/21 2005 (!) 151/90 -- -- 113 -- 98 % -- --   11/23/21 2000 (!) 154/89 -- -- 99 -- 98 % -- --   11/23/21 1955 (!) 142/78 -- -- 107 -- 99 % -- --   11/23/21 1950 (!) 155/84 -- -- 112 -- 98 % -- --     GENERAL:   The patient is a well developed, female who is alert cooperative and oriented times three in no acute distress. HEENT:  Normo cephalic and atraumatic. No facial edema. ABDOMEN:   Her uterus is gravid. She had no complaint of abdominal pain or tenderness. The fetal heart rate is 123 bpm. The fetus was in the cephalic presentation which was confirmed by the ultrasound assessment. EXTREMITIES:  1+ peripheral edema is noted.        Admission on 11/23/2021   Component Date Value Ref Range Status    aPTT 11/23/2021 26.9  24.5 - 35.1 sec Final    Protime 11/23/2021 9.6  9.3 - 12.4 sec Final    INR 11/23/2021 0.9   Final    Fibrinogen 11/23/2021 668* 225 - 540 mg/dL Final    ABO/Rh 11/23/2021 A POS   Final    Antibody Screen 11/23/2021 NEG   Final    Sodium 11/23/2021 135  132 - 146 mmol/L Final    Potassium 11/23/2021 3.2* 3.5 - 5.0 mmol/L Final    Chloride 11/23/2021 101  98 - 107 mmol/L Final    CO2 11/23/2021 20* 22 - 29 mmol/L Final    Anion Gap 11/23/2021 14  7 - 16 mmol/L Final    Glucose 11/23/2021 89  74 - 99 mg/dL Final    BUN 11/23/2021 5* 6 - 20 mg/dL Final    CREATININE 11/23/2021 0.7  0.5 - 1.0 mg/dL Final    GFR Non- 11/23/2021 >60  >=60 mL/min/1.73 Final    GFR  11/23/2021 >60   Final    Calcium 11/23/2021 9.3  8.6 - 10.2 mg/dL Final    Total Protein 11/23/2021 6.4  6.4 - 8.3 g/dL Final    Albumin 11/23/2021 3.6  3.5 - 5.2 g/dL Final    Total Bilirubin 11/23/2021 0.4  0.0 - 1.2 mg/dL Final    Alkaline Phosphatase 11/23/2021 110* 35 - 104 U/L Final    ALT 11/23/2021 54* 0 - 32 U/L Final    AST 11/23/2021 36* 0 - 31 U/L Final    WBC 11/23/2021 10.0  4.5 - 11.5 E9/L Final    RBC 11/23/2021 3.08* 3.50 - 5.50 E12/L Final    Hemoglobin 11/23/2021 10.0* 11.5 - 15.5 g/dL Final    Hematocrit 11/23/2021 28.4* 34.0 - 48.0 % Final    MCV 11/23/2021 92.2  80.0 - 99.9 fL Final    MCH 11/23/2021 32.5  26.0 - 35.0 pg Final    MCHC 11/23/2021 35.2* 32.0 - 34.5 % Final    RDW 11/23/2021 13.2  11.5 - 15.0 fL Final    Platelets 15/64/2315 223  130 - 450 E9/L Final    MPV 11/23/2021 12.4* 7.0 - 12.0 fL Final    Neutrophils % 11/23/2021 71.1  43.0 - 80.0 % Final    Immature Granulocytes % 11/23/2021 0.6  0.0 - 5.0 % Final    Lymphocytes % 11/23/2021 19.8* 20.0 - 42.0 % Final    Monocytes % 11/23/2021 7.2  2.0 - 12.0 % Final    Eosinophils % 11/23/2021 1.0  0.0 - 6.0 % Final    Basophils % 11/23/2021 0.3  0.0 - 2.0 % Final    Neutrophils Absolute 11/23/2021 7.11  1.80 - 7.30 E9/L Final    Immature Granulocytes # 11/23/2021 0.06  E9/L Final    Lymphocytes Absolute 11/23/2021 1.98  1.50 - 4.00 E9/L Final    Monocytes Absolute 11/23/2021 0.72  0.10 - 0.95 E9/L Final    Eosinophils Absolute 11/23/2021 0.10  0.05 - 0.50 E9/L Final    Basophils Absolute 11/23/2021 0.03  0.00 - 0.20 E9/L Final    Color, UA 11/23/2021 Yellow  Straw/Yellow Final    Clarity, UA 11/23/2021 Clear  Clear Final    Glucose, Ur 11/23/2021 Negative  Negative mg/dL Final    Bilirubin Urine 11/23/2021 Negative  Negative Final    Ketones, Urine 11/23/2021 15* Negative mg/dL Final    Specific [place], UA 11/23/2021 <=1.005  1.005 - 1.030 Final    Blood, Urine 11/23/2021 Negative  Negative Final    pH, UA 11/23/2021 7.0  5.0 - 9.0 Final    Protein, UA 11/23/2021 Negative  Negative mg/dL Final    Urobilinogen, Urine 11/23/2021 0.2  <2.0 E.U./dL Final    Nitrite, Urine 11/23/2021 Negative  Negative Final    Leukocyte Esterase, Urine 11/23/2021 Negative  Negative Final  WBC, UA 11/23/2021 1-3  0 - 5 /HPF Final    RBC, UA 11/23/2021 NONE  0 - 2 /HPF Final    Epithelial Cells, UA 11/23/2021 FEW  /HPF Final    Bacteria, UA 11/23/2021 MODERATE* None Seen /HPF Final    Amphetamine Screen, Urine 11/23/2021 NOT DETECTED  Negative <1000 ng/mL Final    Barbiturate Screen, Ur 11/23/2021 NOT DETECTED  Negative < 200 ng/mL Final    Benzodiazepine Screen, Urine 11/23/2021 NOT DETECTED  Negative < 200 ng/mL Final    Cannabinoid Scrn, Ur 11/23/2021 NOT DETECTED  Negative < 50ng/mL Final    Cocaine Metabolite Screen, Urine 11/23/2021 NOT DETECTED  Negative < 300 ng/mL Final    Opiate Scrn, Ur 11/23/2021 NOT DETECTED  Negative < 300ng/mL Final    PCP Screen, Urine 11/23/2021 NOT DETECTED  Negative < 25 ng/mL Final    Methadone Screen, Urine 11/23/2021 NOT DETECTED  Negative <300 ng/mL Final    Oxycodone Urine 11/23/2021 NOT DETECTED  Negative <100 ng/mL Final    FENTANYL SCREEN, URINE 11/23/2021 NOT DETECTED  Negative <1 ng/mL Final    Drug Screen Comment: 11/23/2021 see below   Final    Uric Acid, Serum 11/23/2021 3.9  2.4 - 5.7 mg/dL Final    SARS-CoV-2, NAAT 11/23/2021 Not Detected  Not Detected Final    Sodium 11/24/2021 133  132 - 146 mmol/L Final    Potassium 11/24/2021 3.1* 3.5 - 5.0 mmol/L Final    Chloride 11/24/2021 100  98 - 107 mmol/L Final    CO2 11/24/2021 19* 22 - 29 mmol/L Final    Anion Gap 11/24/2021 14  7 - 16 mmol/L Final    Glucose 11/24/2021 122* 74 - 99 mg/dL Final    BUN 11/24/2021 3* 6 - 20 mg/dL Final    CREATININE 11/24/2021 0.7  0.5 - 1.0 mg/dL Final    GFR Non- 11/24/2021 >60  >=60 mL/min/1.73 Final    GFR  11/24/2021 >60   Final    Calcium 11/24/2021 7.9* 8.6 - 10.2 mg/dL Final    Total Protein 11/24/2021 6.0* 6.4 - 8.3 g/dL Final    Albumin 11/24/2021 3.4* 3.5 - 5.2 g/dL Final    Total Bilirubin 11/24/2021 0.4  0.0 - 1.2 mg/dL Final    Alkaline Phosphatase 11/24/2021 110* 35 - 104 U/L Final    ALT 11/24/2021 51* 0 - 32 U/L Final    AST 11/24/2021 38* 0 - 31 U/L Final    WBC 11/24/2021 14.3* 4.5 - 11.5 E9/L Final    RBC 11/24/2021 2.97* 3.50 - 5.50 E12/L Final    Hemoglobin 11/24/2021 9.7* 11.5 - 15.5 g/dL Final    Hematocrit 11/24/2021 28.2* 34.0 - 48.0 % Final    MCV 11/24/2021 94.9  80.0 - 99.9 fL Final    MCH 11/24/2021 32.7  26.0 - 35.0 pg Final    MCHC 11/24/2021 34.4  32.0 - 34.5 % Final    RDW 11/24/2021 13.2  11.5 - 15.0 fL Final    Platelets 40/12/8854 229  130 - 450 E9/L Final    MPV 11/24/2021 12.8* 7.0 - 12.0 fL Final    Neutrophils % 11/24/2021 85.7* 43.0 - 80.0 % Final    Immature Granulocytes % 11/24/2021 0.6  0.0 - 5.0 % Final    Lymphocytes % 11/24/2021 10.5* 20.0 - 42.0 % Final    Monocytes % 11/24/2021 3.1  2.0 - 12.0 % Final    Eosinophils % 11/24/2021 0.0  0.0 - 6.0 % Final    Basophils % 11/24/2021 0.1  0.0 - 2.0 % Final    Neutrophils Absolute 11/24/2021 12.30* 1.80 - 7.30 E9/L Final    Immature Granulocytes # 11/24/2021 0.09  E9/L Final    Lymphocytes Absolute 11/24/2021 1.50  1.50 - 4.00 E9/L Final    Monocytes Absolute 11/24/2021 0.44  0.10 - 0.95 E9/L Final    Eosinophils Absolute 11/24/2021 0.00* 0.05 - 0.50 E9/L Final    Basophils Absolute 11/24/2021 0.01  0.00 - 0.20 E9/L Final    aPTT 11/24/2021 25.3  24.5 - 35.1 sec Final    Protime 11/24/2021 9.2* 9.3 - 12.4 sec Final    INR 11/24/2021 0.8   Final    Fibrinogen 11/24/2021 667* 225 - 540 mg/dL Final    Uric Acid, Serum 11/24/2021 4.5  2.4 - 5.7 mg/dL Final    Hep A IgM 11/24/2021 Non-Reactive  Non-Reactive Final    Hep B Core Ab, IgM 11/24/2021 Non-Reactive  Non-Reactive Final    Hep B S Ag Interp 11/24/2021 Non-Reactive  Non-Reactive Final    Hep C Ab Interp 11/24/2021 Non-Reactive  Non-Reactive Final    Urine Culture, Routine 11/23/2021 Growth not present, incubation continues   Preliminary       IMPRESSION:    1.  IUP at 31 weeks 0 days gestation based on her Estimated Date of Delivery: 1/26/22    2. Elevated blood pressure in the third trimester with no prior history of HTN    3. Possible urinary tract infection. Culture pending. 4.  Elevated liver enzymes 11/24/2021    5. Not vaccinated for COVID-19    6. /109 in Dr. Lockwood [facility] office 11/23/2021    7. Taking 81 mg ASA daily    8. Category 1 FHR tracing    9.  Reassuring biophysical profile and cord Doppler testing 11/24/2021  10. Celestone course completed 11/24/2021  11. Magnesium sulfate administered for fetal neuro protection 11/23/2021    PLAN:  The patient is currently admitted for evaluation and management. Continuous fetal heart rate and uterine contraction monitoring should be utilized for now. DVT prophylaxis should be utilized throughout her admission. Laboratory testing will be repeated in the morning including a CMP, CBC and uric acid. Consultation with neonatology was requested. Further evaluation and management will be dependent on the results of the patient's testing and her clinical presentation. If you have any questions regarding her management, please contact me at your convenience and thank you for allowing me to participate in her care.     Sincerely,        Argenis Barry MD, MS, Pito Jean, RDCS, RDMS, RVT  Director [address] [place] [address]  [phone]

## 2021-11-25 NOTE — PROGRESS NOTES
Assumed care of pt. Pt sitting comfortably in bed. Denies headache, changes in vision, epigastric pain, and N/V. Denies vaginal bleeding, lof, and contractions. Pt off efm while sitting forward to eat. Vital signs obtained.

## 2021-11-26 LAB — URINE CULTURE, ROUTINE: NORMAL

## 2021-11-29 ENCOUNTER — INITIAL PRENATAL (OUTPATIENT)
Dept: OBGYN CLINIC | Age: 31
End: 2021-11-29
Payer: COMMERCIAL

## 2021-11-29 ENCOUNTER — HOSPITAL ENCOUNTER (OUTPATIENT)
Age: 31
Discharge: HOME | End: 2021-11-29
Payer: COMMERCIAL

## 2021-11-29 ENCOUNTER — ANCILLARY PROCEDURE (OUTPATIENT)
Dept: OBGYN CLINIC | Age: 31
End: 2021-11-29
Payer: COMMERCIAL

## 2021-11-29 VITALS
SYSTOLIC BLOOD PRESSURE: 134 MMHG | WEIGHT: 194 LBS | BODY MASS INDEX: 30.38 KG/M2 | HEART RATE: 90 BPM | DIASTOLIC BLOOD PRESSURE: 88 MMHG

## 2021-11-29 DIAGNOSIS — O16.3 ELEVATED BLOOD PRESSURE AFFECTING PREGNANCY IN THIRD TRIMESTER, ANTEPARTUM: ICD-10-CM

## 2021-11-29 DIAGNOSIS — Z3A.31 31 WEEKS GESTATION OF PREGNANCY: ICD-10-CM

## 2021-11-29 DIAGNOSIS — O16.3 ELEVATED BLOOD PRESSURE AFFECTING PREGNANCY IN THIRD TRIMESTER, ANTEPARTUM: Primary | ICD-10-CM

## 2021-11-29 LAB
ALBUMIN SERPL-MCNC: 3.4 G/DL (ref 3.5–5.2)
ALP BLD-CCNC: 122 U/L (ref 35–104)
ALT SERPL-CCNC: 46 U/L (ref 0–32)
ANION GAP SERPL CALCULATED.3IONS-SCNC: 11 MMOL/L (ref 7–16)
AST SERPL-CCNC: 23 U/L (ref 0–31)
BACTERIA: ABNORMAL /HPF
BILIRUB SERPL-MCNC: 0.3 MG/DL (ref 0–1.2)
BILIRUBIN URINE: NEGATIVE
BLOOD, URINE: NEGATIVE
BUN BLDV-MCNC: 5 MG/DL (ref 6–20)
CALCIUM SERPL-MCNC: 9.6 MG/DL (ref 8.6–10.2)
CHLORIDE BLD-SCNC: 103 MMOL/L (ref 98–107)
CLARITY: CLEAR
CO2: 22 MMOL/L (ref 22–29)
COLOR: YELLOW
CREAT SERPL-MCNC: 0.7 MG/DL (ref 0.5–1)
GFR AFRICAN AMERICAN: >60
GFR NON-AFRICAN AMERICAN: >60 ML/MIN/1.73
GLUCOSE BLD-MCNC: 88 MG/DL (ref 74–99)
GLUCOSE URINE, POC: NEGATIVE
GLUCOSE URINE: NEGATIVE MG/DL
HCT VFR BLD CALC: 27.9 % (ref 34–48)
HEMOGLOBIN: 9.7 G/DL (ref 11.5–15.5)
KETONES, URINE: 15 MG/DL
LEUKOCYTE ESTERASE, URINE: NEGATIVE
MCH RBC QN AUTO: 32.4 PG (ref 26–35)
MCHC RBC AUTO-ENTMCNC: 34.8 % (ref 32–34.5)
MCV RBC AUTO: 93.3 FL (ref 80–99.9)
NITRITE, URINE: NEGATIVE
PDW BLD-RTO: 13.2 FL (ref 11.5–15)
PH UA: 7.5 (ref 5–9)
PLATELET # BLD: 217 E9/L (ref 130–450)
PMV BLD AUTO: 12.5 FL (ref 7–12)
POTASSIUM SERPL-SCNC: 3.1 MMOL/L (ref 3.5–5)
PROTEIN UA: NEGATIVE
PROTEIN UA: NEGATIVE MG/DL
RBC # BLD: 2.99 E12/L (ref 3.5–5.5)
RBC UA: ABNORMAL /HPF (ref 0–2)
SODIUM BLD-SCNC: 136 MMOL/L (ref 132–146)
SPECIFIC GRAVITY UA: 1.01 (ref 1–1.03)
TOTAL PROTEIN: 6 G/DL (ref 6.4–8.3)
URIC ACID, SERUM: 4.6 MG/DL (ref 2.4–5.7)
UROBILINOGEN, URINE: 0.2 E.U./DL
WBC # BLD: 10.1 E9/L (ref 4.5–11.5)
WBC UA: ABNORMAL /HPF (ref 0–5)

## 2021-11-29 PROCEDURE — 81002 URINALYSIS NONAUTO W/O SCOPE: CPT | Performed by: OBSTETRICS & GYNECOLOGY

## 2021-11-29 PROCEDURE — 99213 OFFICE O/P EST LOW 20 MIN: CPT | Performed by: OBSTETRICS & GYNECOLOGY

## 2021-11-29 PROCEDURE — G8484 FLU IMMUNIZE NO ADMIN: HCPCS | Performed by: OBSTETRICS & GYNECOLOGY

## 2021-11-29 PROCEDURE — 84550 ASSAY OF BLOOD/URIC ACID: CPT

## 2021-11-29 PROCEDURE — 99203 OFFICE O/P NEW LOW 30 MIN: CPT | Performed by: OBSTETRICS & GYNECOLOGY

## 2021-11-29 PROCEDURE — 80053 COMPREHEN METABOLIC PANEL: CPT

## 2021-11-29 PROCEDURE — G8419 CALC BMI OUT NRM PARAM NOF/U: HCPCS | Performed by: OBSTETRICS & GYNECOLOGY

## 2021-11-29 PROCEDURE — 1036F TOBACCO NON-USER: CPT | Performed by: OBSTETRICS & GYNECOLOGY

## 2021-11-29 PROCEDURE — 81001 URINALYSIS AUTO W/SCOPE: CPT

## 2021-11-29 PROCEDURE — G8427 DOCREV CUR MEDS BY ELIG CLIN: HCPCS | Performed by: OBSTETRICS & GYNECOLOGY

## 2021-11-29 PROCEDURE — 85027 COMPLETE CBC AUTOMATED: CPT

## 2021-11-29 PROCEDURE — 76818 FETAL BIOPHYS PROFILE W/NST: CPT | Performed by: OBSTETRICS & GYNECOLOGY

## 2021-11-29 PROCEDURE — 36415 COLL VENOUS BLD VENIPUNCTURE: CPT

## 2021-11-29 PROCEDURE — 76820 UMBILICAL ARTERY ECHO: CPT | Performed by: OBSTETRICS & GYNECOLOGY

## 2021-11-29 NOTE — PROGRESS NOTES
21    Aracelis Cantrell MD  [address] [facility], [address] [facility]                RE:  Lucero Mcnamara  : 1990   AGE: 32 y.o.     This report has been created using voice recognition software. It may contain errors which are inherent in voice recognition technology.     Dear Dr. Jai Hubbard:  Villa Saeed had a consultation today for the following indications:     Patient Active Problem List   Diagnosis    Elevated blood pressure affecting pregnancy in third trimester, antepartum      Crow Cardozo is a 32 y.o. female, who is G1(0). She has an Estimated Date of Delivery: 22 based on her previous ultrasound assessment. She is currently 31 weeks 5 days gestation based on that assessment. The patient signed out of the hospital  [address] on 2021 understanding the potential increased risk to her and her fetus by leaving the hospital.  She was admitted for elevated liver enzymes and high blood pressure. Her reported blood pressure Dr. Aster [place] office prior to admission was 168/109. The patient was started on labetalol initially at a dose of 50 mg every 12 hours. This was increased to 100 mg every 12 hours. The patient states she has been checking her blood pressures at home. Her blood pressures have improved on this dose of labetalol. The patient states that her fetal movement has been good. She had no symptoms of hypertension. She has had no vaginal bleeding or leaking of amniotic fluid. She had no complaint of abdominal pain or tenderness. She has lower extremity edema for the past month.     The NST today was reactive with moderate variability and accelerations.      The patient recently traveled to [facility].  She stated that she had a negative Covid antibody screen when returning to the [place].     The patient's liver enzymes were slightly elevated today. Her platelet count was within normal limits. She has no history of liver disease. Her highest recorded blood pressure in the hospital was 158/88. She had no symptomatology related to hypertension.     A fetal ultrasound evaluation was performed on 11/23/2021. A detailed report included in the EMR under the imaging tab from today's date. A living borrego intrauterine fetus was identified in the cephalic presentation, with normal fetal heart motion and normal fetal motion noted. The amniotic fluid index was 14.1 cm. The placenta was posterior. Visualization of the fetal anatomy was somewhat limited secondary to the fetal position. No apparent gross fetal anatomic abnormalities were identified in the areas which were visualized. The estimated fetal weight was at the 70th growth percentile for gestational age. The biophysical profile and cord Doppler studies were both reassuring. There was no evidence of absence, or reversal of end-diastolic flow. The ANDREA today was 9.4 cm. The fetus was in the cephalic presentation. The BPP and cord Doppler assessments were both reassuring.   There was no evidence of absence, or reversal of end-diastolic flow.                    GENETIC SCREENING/TERATOLOGY COUNSELING              (Includes patient, FTB, and any affected family members)     Patient Age > 35 Years NO   Thalassemia ( MVC<80) NO   Congential Heart Defect NO   Neural Tube Defect NO   Mainor-Sachs NO   Sickle Cell Disease NO   Sickle Cell Trait NO   Sickle C Disease or Trait NO   Hemophilia NO   Muscular Dystrophy NO   Cystic Fibrosis NO   [place] Disease NO   Autism NO   Mental Retardation NO   History of Fragile X NO   Maternal Diabetes NO   Other Genetic Disease or Syndrome NO   Previous Child With Congenital Abnormality Not Listed NO   Recreational Drugs: Marijuana use YES                                                                  INFECTION HISTORY         HEPATITIS IMMUNIZED:  NO   HEPATITIS INFECTION:  NO   EXPOSURE TO TB NO   PARVOVIRUS B-19 NO   CHICKEN POX  NO   MEASLES NO HIV NO   OTHER RASH OR VIRAL ILLNESS SINCE LMP NO   UTI RECURRENT NO   HPV NO      OB History    Para Term  AB Living   1             SAB IAB Ectopic Molar Multiple Live Births                      # Outcome Date GA Lbr Cornelio/2nd Weight Sex Delivery Anes PTL Lv   1 Current                        PAST GYNECOLOGICAL  HISTORY:  Negative for abnormal pap smears. Positive for sexually transmitted diseases. Trichomoniasis    Negative for cervical LEEP / conization /cryosurgery. Negative for uterine surgery. Negative for ovarian or tubal surgery.      Past Medical History   No past medical history on file.        Past Surgical History   No past surgical history on file.        No Known Allergies       Current Outpatient Medications   Medication Sig Dispense Refill    labetalol (NORMODYNE) 100 MG tablet Take 1 tablet by mouth every 12 hours 60 tablet 0    aspirin 81 MG chewable tablet Take 81 mg by mouth daily      Prenat-FeAsp-Meth-FA-DHA w/o A (PRENATE DHA) 18-0.6-0.4-300 MG CAPS Take 1 tablet by mouth daily 30 capsule 11        Social History      Tobacco Use    Smoking status: Current Every Day Smoker       Packs/day: 0.50    Smokeless tobacco: Never Used   Substance Use Topics    Alcohol use: No         FAMILY MEDICAL HISTORY:   Negative for congenital abnormalities, autism, genetic disease and mental retardation, not listed above.      Review of Systems :   CONSTITUTIONAL : No fever, no chills   HEENT : No headache, no visual changes, no rhinorrhea, no sore throat   CARDIOVASCULAR : No pain, no palpitations, no edema   RESPIRATORY : No pain, no shortness of breath   GASTROINTESTINAL : No N/V, no D/C, no abdominal pain   GENITOURINARY : No dysuria, hematuria and no incontinence   MUSCULOSKELETAL : No myalgia, No back pain  NEUROLOGICAL : No numbness, no tingling, no tremors.  No history of seizures  ALL OTHER SYSTEMS WERE REPORTED AS NEGATIVE.     PERTINENT PHYSICAL EXAMINATION:   /88 35.0 pg Final    MCHC 11/23/2021 35.2* 32.0 - 34.5 % Final    RDW 11/23/2021 13.2  11.5 - 15.0 fL Final    Platelets 24/29/1332 223  130 - 450 E9/L Final    MPV 11/23/2021 12.4* 7.0 - 12.0 fL Final    Neutrophils % 11/23/2021 71.1  43.0 - 80.0 % Final    Immature Granulocytes % 11/23/2021 0.6  0.0 - 5.0 % Final    Lymphocytes % 11/23/2021 19.8* 20.0 - 42.0 % Final    Monocytes % 11/23/2021 7.2  2.0 - 12.0 % Final    Eosinophils % 11/23/2021 1.0  0.0 - 6.0 % Final    Basophils % 11/23/2021 0.3  0.0 - 2.0 % Final    Neutrophils Absolute 11/23/2021 7.11  1.80 - 7.30 E9/L Final    Immature Granulocytes # 11/23/2021 0.06  E9/L Final    Lymphocytes Absolute 11/23/2021 1.98  1.50 - 4.00 E9/L Final    Monocytes Absolute 11/23/2021 0.72  0.10 - 0.95 E9/L Final    Eosinophils Absolute 11/23/2021 0.10  0.05 - 0.50 E9/L Final    Basophils Absolute 11/23/2021 0.03  0.00 - 0.20 E9/L Final    Color, UA 11/23/2021 Yellow  Straw/Yellow Final    Clarity, UA 11/23/2021 Clear  Clear Final    Glucose, Ur 11/23/2021 Negative  Negative mg/dL Final    Bilirubin Urine 11/23/2021 Negative  Negative Final    Ketones, Urine 11/23/2021 15* Negative mg/dL Final    Specific [place], UA 11/23/2021 <=1.005  1.005 - 1.030 Final    Blood, Urine 11/23/2021 Negative  Negative Final    pH, UA 11/23/2021 7.0  5.0 - 9.0 Final    Protein, UA 11/23/2021 Negative  Negative mg/dL Final    Urobilinogen, Urine 11/23/2021 0.2  <2.0 E.U./dL Final    Nitrite, Urine 11/23/2021 Negative  Negative Final    Leukocyte Esterase, Urine 11/23/2021 Negative  Negative Final    WBC, UA 11/23/2021 1-3  0 - 5 /HPF Final    RBC, UA 11/23/2021 NONE  0 - 2 /HPF Final    Epithelial Cells, UA 11/23/2021 FEW  /HPF Final    Bacteria, UA 11/23/2021 MODERATE* None Seen /HPF Final    Amphetamine Screen, Urine 11/23/2021 NOT DETECTED  Negative <1000 ng/mL Final    Barbiturate Screen, Ur 11/23/2021 NOT DETECTED  Negative < 200 ng/mL Final    Benzodiazepine Screen, Urine 2021 NOT DETECTED  Negative < 200 ng/mL Final    Cannabinoid Scrn, Ur 2021 NOT DETECTED  Negative < 50ng/mL Final    Cocaine Metabolite Screen, Urine 2021 NOT DETECTED  Negative < 300 ng/mL Final    Opiate Scrn, Ur 2021 NOT DETECTED  Negative < 300ng/mL Final    PCP Screen, Urine 2021 NOT DETECTED  Negative < 25 ng/mL Final    Methadone Screen, Urine 2021 NOT DETECTED  Negative <300 ng/mL Final    Oxycodone Urine 2021 NOT DETECTED  Negative <100 ng/mL Final    FENTANYL SCREEN, URINE 2021 NOT DETECTED  Negative <1 ng/mL Final    Drug Screen Comment: 2021 see below    Final    Uric Acid, Serum 2021 3.9  2.4 - 5.7 mg/dL Final      IMPRESSION:  1.  IUP at 31 weeks 5 days gestation based on her Estimated Date of Delivery: 22  2. Elevated blood pressure in the third trimester with no prior history of HTN  3. Possible urinary tract infection. Culture pending. 4.  Elevated liver enzymes 2021  5. Not vaccinated for COVID-19  6. /109 in Dr. Myles Asa office 2021  7. Taking 81 mg ASA daily  8. Category 1 FHR tracing  9. Blood pressure was well controlled 2021     PLAN:  I would recommend that the patient count fetal movements and call if she notices any subjective decrease in fetal movements, particularly if there are less than 10 major movements in an hour. Non-stress testing should be performed every 3 to 4 days through the balance of the pregnancy. Serial ultrasounds to assess fetal anatomy and growth should be performed. The patient is at increase risk for  morbidity and mortality secondary to her history. Weekly BPP and cord Doppler testing should be performed, unless there is a clinical indication to perform the testing more frequently.     The patient is to call is she has any increased vaginal discharge, vaginal bleeding, contractions, abdominal pain, back pain or any new

## 2021-11-29 NOTE — PATIENT INSTRUCTIONS
Patient Education        Learning About When to Call Your Doctor During Pregnancy (After 20 Weeks)  Overview  It's common to have concerns about what might be a problem when you're pregnant. Most pregnancies don't have any serious problems. But it's still important to know when to call your doctor if you have certain symptoms or signs of labor. These are general suggestions. Your doctor may give you some more information about when to call. When to call your doctor (after 20 weeks)  Call 911  anytime you think you may need emergency care. For example, call if:  · You have severe vaginal bleeding. · You have sudden, severe pain in your belly. · You passed out (lost consciousness). · You have a seizure. · You see or feel the umbilical cord. · You think you are about to deliver your baby and can't make it safely to the hospital.  Call your doctor now or seek immediate medical care if:  · You have vaginal bleeding. · You have belly pain. · You have a fever. · You have symptoms of preeclampsia, such as:  ? Sudden swelling of your face, hands, or feet. ? New vision problems (such as dimness, blurring, or seeing spots). ? A severe headache. · You have a sudden release of fluid from your vagina. (You think your water broke.)  · You think that you may be in labor. This means that you've had at least 6 contractions in an hour. · You notice that your baby has stopped moving or is moving much less than normal.  · You have symptoms of a urinary tract infection. These may include:  ? Pain or burning when you urinate. ? A frequent need to urinate without being able to pass much urine. ? Pain in the flank, which is just below the rib cage and above the waist on either side of the back. ? Blood in your urine. Watch closely for changes in your health, and be sure to contact your doctor if:  · You have vaginal discharge that smells bad. · You have skin changes, such as:  ? A rash. ? Itching.   ? Yellow color to

## 2021-12-10 ENCOUNTER — ANCILLARY PROCEDURE (OUTPATIENT)
Dept: OBGYN CLINIC | Age: 31
End: 2021-12-10
Payer: COMMERCIAL

## 2021-12-10 ENCOUNTER — ROUTINE PRENATAL (OUTPATIENT)
Dept: OBGYN CLINIC | Age: 31
End: 2021-12-10
Payer: COMMERCIAL

## 2021-12-10 VITALS
HEART RATE: 97 BPM | BODY MASS INDEX: 29.95 KG/M2 | DIASTOLIC BLOOD PRESSURE: 99 MMHG | WEIGHT: 191.25 LBS | SYSTOLIC BLOOD PRESSURE: 146 MMHG

## 2021-12-10 DIAGNOSIS — O16.3 ELEVATED BLOOD PRESSURE AFFECTING PREGNANCY IN THIRD TRIMESTER, ANTEPARTUM: Primary | ICD-10-CM

## 2021-12-10 DIAGNOSIS — L29.9 PRURITUS: ICD-10-CM

## 2021-12-10 LAB
GLUCOSE URINE, POC: NEGATIVE
PROTEIN UA: NEGATIVE

## 2021-12-10 PROCEDURE — 99213 OFFICE O/P EST LOW 20 MIN: CPT | Performed by: OBSTETRICS & GYNECOLOGY

## 2021-12-10 PROCEDURE — 76818 FETAL BIOPHYS PROFILE W/NST: CPT | Performed by: OBSTETRICS & GYNECOLOGY

## 2021-12-10 PROCEDURE — 81002 URINALYSIS NONAUTO W/O SCOPE: CPT | Performed by: OBSTETRICS & GYNECOLOGY

## 2021-12-10 PROCEDURE — 99213 OFFICE O/P EST LOW 20 MIN: CPT

## 2021-12-10 PROCEDURE — G8427 DOCREV CUR MEDS BY ELIG CLIN: HCPCS | Performed by: OBSTETRICS & GYNECOLOGY

## 2021-12-10 PROCEDURE — 1036F TOBACCO NON-USER: CPT | Performed by: OBSTETRICS & GYNECOLOGY

## 2021-12-10 PROCEDURE — G8484 FLU IMMUNIZE NO ADMIN: HCPCS | Performed by: OBSTETRICS & GYNECOLOGY

## 2021-12-10 PROCEDURE — 76816 OB US FOLLOW-UP PER FETUS: CPT | Performed by: OBSTETRICS & GYNECOLOGY

## 2021-12-10 PROCEDURE — G8419 CALC BMI OUT NRM PARAM NOF/U: HCPCS | Performed by: OBSTETRICS & GYNECOLOGY

## 2021-12-10 PROCEDURE — 76820 UMBILICAL ARTERY ECHO: CPT | Performed by: OBSTETRICS & GYNECOLOGY

## 2021-12-10 NOTE — PATIENT INSTRUCTIONS
pneumonia. ? Be obese or get diabetes later in life. · Breastfeeding causes the release of a hormone called oxytocin. This hormone may help your uterus shrink back faster. · Breastfeeding may help you lose weight faster. Making milk burns calories. · Breastfeeding can lower your risk of breast cancer, ovarian cancer, and osteoporosis. Decide about circumcision for your baby  · As you make this decision, it may help to think about your personal, Gnosticist, and family traditions. You get to decide if you will keep your baby's penis natural or if your baby will be circumcised. · If you decide that you would like to have your baby circumcised, talk with your doctor. You can share your concerns about pain. And you can discuss your preferences for anesthesia. Where can you learn more? Go to https://Raven [employer]ote[employer]ologies.Versa Networks. org and sign in to your Kiwigrid account. Enter V386 in the MyBuys box to learn more about \"Weeks 32 to 34 of Your Pregnancy: Care Instructions. \"     If you do not have an account, please click on the \"Sign Up Now\" link. Current as of: June 16, 2021               Content Version: 13.0  © 8925-1125 [employer]. Care instructions adapted under license by [facility] ([facility]). If you have questions about a medical condition or this instruction, always ask your healthcare professional. [address] any warranty or liability for your use of this information. Patient Education        Learning About When to Call Your Doctor During Pregnancy (After 20 Weeks)  Overview  It's common to have concerns about what might be a problem when you're pregnant. Most pregnancies don't have any serious problems. But it's still important to know when to call your doctor if you have certain symptoms or signs of labor. These are general suggestions. Your doctor may give you some more information about when to call.   When to call your doctor (after 20 weeks)  Call 911  anytime you think you may need emergency care. For example, call if:  · You have severe vaginal bleeding. · You have sudden, severe pain in your belly. · You passed out (lost consciousness). · You have a seizure. · You see or feel the umbilical cord. · You think you are about to deliver your baby and can't make it safely to the hospital.  Call your doctor now or seek immediate medical care if:  · You have vaginal bleeding. · You have belly pain. · You have a fever. · You have symptoms of preeclampsia, such as:  ? Sudden swelling of your face, hands, or feet. ? New vision problems (such as dimness, blurring, or seeing spots). ? A severe headache. · You have a sudden release of fluid from your vagina. (You think your water broke.)  · You think that you may be in labor. This means that you've had at least 6 contractions in an hour. · You notice that your baby has stopped moving or is moving much less than normal.  · You have symptoms of a urinary tract infection. These may include:  ? Pain or burning when you urinate. ? A frequent need to urinate without being able to pass much urine. ? Pain in the flank, which is just below the rib cage and above the waist on either side of the back. ? Blood in your urine. Watch closely for changes in your health, and be sure to contact your doctor if:  · You have vaginal discharge that smells bad. · You have skin changes, such as:  ? A rash. ? Itching. ? Yellow color to your skin. · You have other concerns about your pregnancy. If you have labor signs at 37 weeks or more  If you have signs of labor at 37 weeks or more, your doctor may tell you to call when your labor becomes more active. Symptoms of active labor include:  · Contractions that are regular. · Contractions that are less than 5 minutes apart. · Contractions that are hard to talk through. Follow-up care is a key part of your treatment and safety.  Be sure to make and go to all appointments, and call your doctor if you are having problems. It's also a good idea to know your test results and keep a list of the medicines you take. Where can you learn more? Go to https://WebcentrixpeidalmisCurate.Us.Avaak. org and sign in to your [employer] account. Enter  in the [facility] box to learn more about \"Learning About When to Call Your Doctor During Pregnancy (After 20 Weeks). \"     If you do not have an account, please click on the \"Sign Up Now\" link. Current as of: June 16, 2021               Content Version: 13.0  © 3498-2715 [employer]. Care instructions adapted under license by [facility] ([facility]). If you have questions about a medical condition or this instruction, always ask your healthcare professional. [address] any warranty or liability for your use of this information. Patient Education        Counting Your Baby's Kicks: Care Instructions  Overview     Counting your baby's kicks is one way your doctor can tell that your baby is healthy. Most women--especially in a first pregnancy--feel their baby move for the first time between 16 and 22 weeks. The movement may feel like flutters rather than kicks. Your baby may move more at certain times of the day. When you are active, you may notice less kicking than when you are resting. At your prenatal visits, your doctor will ask whether the baby is active. In your last trimester, your doctor may ask you to count the number of times you feel your baby move. Follow-up care is a key part of your treatment and safety. Be sure to make and go to all appointments, and call your doctor if you are having problems. It's also a good idea to know your test results and keep a list of the medicines you take. How do you count fetal kicks? · A common method of checking your baby's movement is to note the length of time it takes to count ten movements (such as kicks, flutters, or rolls).   · Pick your baby's most active time of day to count. This may be any time from morning to evening. · If you don't feel 10 movements in an hour, have something to eat or drink and count for another hour. If you don't feel at least 10 movements in the 2-hour period, call your doctor. When should you call for help? Call your doctor now or seek immediate medical care if:    · You noticed that your baby has stopped moving or is moving much less than normal.   Watch closely for changes in your health, and be sure to contact your doctor if you have any problems. Where can you learn more? Go to https://Cellomics TechnologypePlectix Biosystemseweb.Varxity Development Corp. org and sign in to your Powerset account. Enter P286 in the TripFlick [employer] Guide box to learn more about \"Counting Your Baby's Kicks: Care Instructions. \"     If you do not have an account, please click on the \"Sign Up Now\" link. Current as of: June 16, 2021               Content Version: 13.0  © 2006-2021 Healthwise, Incorporated. Care instructions adapted under license by [facility] ([facility]). If you have questions about a medical condition or this instruction, always ask your healthcare professional. [place] [address] any warranty or liability for your use of this information.

## 2021-12-10 NOTE — PROGRESS NOTES
12/10/21    Kristyn Becker MD  [address] [facility] [address]                 RE:  Kiet Rivera  : 1990   AGE: 32 y.o.     This report has been created using voice recognition software. It may contain errors which are inherent in voice recognition technology.     Dear Dr. Lacey Andrew:  Marvin Rosalesa had a consultation today for the following indications:     Patient Active Problem List   Diagnosis    Elevated blood pressure affecting pregnancy in third trimester, antepartum      Jacqueline Melton is a 32 y.o. female, who is G1(0). She has an Estimated Date of Delivery: 22 based on her previous ultrasound assessment. She is currently 33 weeks 2 days gestation based on that assessment. The patient signed out of the hospital [address] on 2021 understanding the potential increased risk to her and her fetus by leaving the hospital.  She was admitted for elevated liver enzymes and high blood pressure. Her reported blood pressure Dr. Jeffrey Hamm office prior to admission was 168/109. The patient was started on labetalol initially at a dose of 50 mg every 12 hours. This was increased to 100 mg every 12 hours. The patient states she has been checking her blood pressures at home. Her blood pressures At home, her blood pressures have been WNL. Her blood pressures today were elevated. Her initial blood pressure was 144/90. The repeat assessments were 155/94 and 146/99. She had no symptoms of hypertension. She has had no vaginal bleeding or leaking of amniotic fluid. She had no complaint of abdominal pain or tenderness. She has lower extremity edema for the past month. The patient states that her fetal movement has been good.      The NST today was reactive with moderate variability and accelerations. An occasional variable swas noted.     The patient traveled to [facility] 2021-2021.   She stated that she had a negative Covid antibody screen when returning to the [facility].      A fetal ultrasound evaluation was performed on 12/10/2021. A detailed report included in the EMR under the imaging tab from today's date. A living borrego intrauterine fetus was identified in the cephalic presentation, with normal fetal heart motion and normal fetal motion noted. The amniotic fluid index was 9.2 cm. The placenta was posterior and fundal.  The estimated fetal weight was at the 67th growth percentile for gestational age. The biophysical profile and cord Doppler studies were both reassuring. There was no evidence of absence, or reversal of end-diastolic flow.                  GENETIC SCREENING/TERATOLOGY COUNSELING              (Includes patient, FTB, and any affected family members)     Patient Age > 35 Years NO   Thalassemia ( MVC<80) NO   Congential Heart Defect NO   Neural Tube Defect NO   Mainor-Sachs NO   Sickle Cell Disease NO   Sickle Cell Trait NO   Sickle C Disease or Trait NO   Hemophilia NO   Muscular Dystrophy NO   Cystic Fibrosis NO   [place] Disease NO   Autism NO   Mental Retardation NO   History of Fragile X NO   Maternal Diabetes NO   Other Genetic Disease or Syndrome NO   Previous Child With Congenital Abnormality Not Listed NO   Recreational Drugs: Marijuana use YES                                                                  INFECTION HISTORY         HEPATITIS IMMUNIZED:  NO   HEPATITIS INFECTION:  NO   EXPOSURE TO TB NO   PARVOVIRUS B-19 NO   CHICKEN POX  NO   MEASLES NO   HIV NO   OTHER RASH OR VIRAL ILLNESS SINCE LMP NO   UTI RECURRENT NO   HPV NO      OB History    Para Term  AB Living   1             SAB IAB Ectopic Molar Multiple Live Births                      # Outcome Date GA Lbr Cornelio/2nd Weight Sex Delivery Anes PTL Lv   1 Current                        PAST GYNECOLOGICAL  HISTORY:  Negative for abnormal pap smears. Positive for sexually transmitted diseases.  Trichomoniasis    Negative for cervical LEEP / conization /cryosurgery. Negative for uterine surgery. Negative for ovarian or tubal surgery.      Past Medical History   No past medical history on file.        Past Surgical History   No past surgical history on file.        No Known Allergies       Current Outpatient Medications   Medication Sig Dispense Refill    labetalol (NORMODYNE) 100 MG tablet Take 1 tablet by mouth every 12 hours 60 tablet 0    aspirin 81 MG chewable tablet Take 81 mg by mouth daily      Prenat-FeAsp-Meth-FA-DHA w/o A (PRENATE DHA) 18-0.6-0.4-300 MG CAPS Take 1 tablet by mouth daily 30 capsule 11        Social History      Tobacco Use    Smoking status: Current Every Day Smoker       Packs/day: 0.50    Smokeless tobacco: Never Used   Substance Use Topics    Alcohol use: No         FAMILY MEDICAL HISTORY:   Negative for congenital abnormalities, autism, genetic disease and mental retardation, not listed above.      Review of Systems :   CONSTITUTIONAL : No fever, no chills   HEENT : No headache, no visual changes, no rhinorrhea, no sore throat   CARDIOVASCULAR : No pain, no palpitations, no edema   RESPIRATORY : No pain, no shortness of breath   GASTROINTESTINAL : No N/V, no D/C, no abdominal pain   GENITOURINARY : No dysuria, hematuria and no incontinence   MUSCULOSKELETAL : No myalgia, No back pain  NEUROLOGICAL : No numbness, no tingling, no tremors. No history of seizures  ALL OTHER SYSTEMS WERE REPORTED AS NEGATIVE.     PERTINENT PHYSICAL EXAMINATION:   BP (!) 146/99   Pulse 97   Wt 191 lb 4 oz (86.8 kg)   LMP 04/15/2021   BMI 29.95 kg/m²   Negative Glucose  Negative Protein     GENERAL:   The patient is a well developed, female who is alert cooperative and oriented times three in no acute distress.     HEENT:  Normo cephalic and atraumatic. No facial edema.      ABDOMEN:   Her uterus is gravid. She had no complaint of abdominal pain or tenderness.  The fetal heart rate is 135 bpm. The fetus is in the cephalic presentation which was confirmed by the ultrasound assessment.     EXTREMITIES:  1+ peripheral edema is noted. IMPRESSION:  1.  IUP at 33 weeks 2 days gestation based on her Estimated Date of Delivery: 22  2. Elevated blood pressure in the third trimester with no prior history of HTN  3. Possible urinary tract infection. Culture pending. 4.  Elevated liver enzymes 2021  5. Not vaccinated for COVID-19  6. /109 in Dr. Markos Lino office 2021  7. Taking 81 mg ASA daily  8. Category 1 FHR tracing  9. Blood pressure elevated 12/10/2021     PLAN:  I would recommend that the patient count fetal movements and call if she notices any subjective decrease in fetal movements, particularly if there are less than 10 major movements in an hour. Non-stress testing should be performed every 3 to 4 days through the balance of the pregnancy. Serial ultrasounds to assess fetal anatomy and growth should be performed. The patient is at increase risk for  morbidity and mortality secondary to her history. Weekly BPP and cord Doppler testing should be performed, unless there is a clinical indication to perform the testing more frequently. The patient is to call is she has any increased vaginal discharge, vaginal bleeding, contractions, abdominal pain, back pain or any new significant symptomatology prior to her next visit. I advised her that these are signs and symptoms of cervical change and require follow-up assessment when they occur. Laboratory testing was ordered for today, including a CMP, CBC, total bile acids, uric acid, and urinalysis. The patient is to increase her labetalol dose to 100 mg every 8 hours. The patient is to return to our office for follow-up assessment in 3 days unless she has a clinical indication return prior to that time. As we discussed at the time of the patient's assessment, she is scheduled to see you in your office today, 12/10/2021.  If her blood pressure is elevated again at that time, I would recommend that she be admitted for further assesment. The total time in minutes spent with the patient, reviewing medical records, reviewing imaging studies, performing ultrasonic imaging, reviewing laboratory testing, and documenting information was 31 minutes, of which, 50% of the time was spent in patient education, counseling, and coordinating care with the patient, her provider, and/or her family. Available electronic and paper medical records were reviewed. I discussed with the patient the importance of having the laboratory work ordered today performed. She signed out of the hospital [address] on 11/25/2021. She understands the potential risks associated with this action. I recommended that she be off work for the balance of her pregnancy secondary to her elevated blood pressures. I answered all of her questions to her satisfaction. I asked her to call if she had any additional questions prior to her next visit.       The patient is to continue to follow with you in your office for ongoing prenatal care.     Further evaluation and management will be dependent on the results of the patient's testing and her clinical presentation.     If you have any questions regarding her management, please contact me at your convenience and thank you for allowing me to participate in her care.     Sincerely,           Eliezer Jolly MD, [address], Lata Caruso, [address] [facility], [address] KRYSTIN  Director [address] [place] [address]  [phone]

## 2021-12-13 ENCOUNTER — ANCILLARY PROCEDURE (OUTPATIENT)
Dept: OBGYN CLINIC | Age: 31
End: 2021-12-13
Payer: COMMERCIAL

## 2021-12-13 ENCOUNTER — ROUTINE PRENATAL (OUTPATIENT)
Dept: OBGYN CLINIC | Age: 31
End: 2021-12-13
Payer: COMMERCIAL

## 2021-12-13 VITALS
WEIGHT: 192 LBS | DIASTOLIC BLOOD PRESSURE: 86 MMHG | HEART RATE: 93 BPM | SYSTOLIC BLOOD PRESSURE: 128 MMHG | BODY MASS INDEX: 30.07 KG/M2

## 2021-12-13 DIAGNOSIS — O16.3 ELEVATED BLOOD PRESSURE AFFECTING PREGNANCY IN THIRD TRIMESTER, ANTEPARTUM: Primary | ICD-10-CM

## 2021-12-13 PROCEDURE — 81002 URINALYSIS NONAUTO W/O SCOPE: CPT | Performed by: OBSTETRICS & GYNECOLOGY

## 2021-12-13 PROCEDURE — G8484 FLU IMMUNIZE NO ADMIN: HCPCS | Performed by: OBSTETRICS & GYNECOLOGY

## 2021-12-13 PROCEDURE — 99213 OFFICE O/P EST LOW 20 MIN: CPT | Performed by: OBSTETRICS & GYNECOLOGY

## 2021-12-13 PROCEDURE — G8427 DOCREV CUR MEDS BY ELIG CLIN: HCPCS | Performed by: OBSTETRICS & GYNECOLOGY

## 2021-12-13 PROCEDURE — 76818 FETAL BIOPHYS PROFILE W/NST: CPT | Performed by: OBSTETRICS & GYNECOLOGY

## 2021-12-13 PROCEDURE — G8419 CALC BMI OUT NRM PARAM NOF/U: HCPCS | Performed by: OBSTETRICS & GYNECOLOGY

## 2021-12-13 PROCEDURE — 1036F TOBACCO NON-USER: CPT | Performed by: OBSTETRICS & GYNECOLOGY

## 2021-12-13 NOTE — PROGRESS NOTES
21    Ernestine Burnett MD  [address] [facility] Zelpha Pitch, [address]                RE:  Rena Valencia  : 1990   AGE: 32 y.o.     This report has been created using voice recognition software. It may contain errors which are inherent in voice recognition technology.     Dear Dr. Aby Farrar:  Jocelyne Samayoa had a consultation today for the following indications:     Patient Active Problem List   Diagnosis    Elevated blood pressure affecting pregnancy in third trimester, antepartum      Sharan De La Cruz is a 32 y.o. female, who is G1(0). She has an Estimated Date of Delivery: 22 based on her previous ultrasound assessment. She is currently 33 weeks 5 days gestation based on that assessment. The patient signed out of the hospital [address] on 2021 understanding the potential increased risk to her and her fetus by leaving the hospital.  She was admitted for elevated liver enzymes and high blood pressure. Her reported blood pressure Dr. Chelle James office prior to admission was 168/109. The patient was initially prescribed, labetalol 50 mg every 12 hours. This was increased to 100 mg every 8 hours. Her blood pressures today were elevated. Her initial blood pressure was 147/100. The repeat assessment was 128/86. She had no symptoms of hypertension. She has had no vaginal bleeding or leaking of amniotic fluid. She had no complaint of abdominal pain or tenderness. She has lower extremity edema for the past month. Her fetal movement has been good.      The patient traveled to [facility] 2021-2021. She stated that she had a negative Covid antibody screen when returning to the [place]. The NST today was reactive with moderate variability and accelerations.       A fetal ultrasound evaluation was performed on 12/10/2021. A detailed report included in the EMR under the imaging tab from today's date.   A living borrego intrauterine fetus was identified in the cephalic presentation, with normal fetal heart motion and normal fetal motion noted. The amniotic fluid index was 9.2 cm. The placenta was posterior and fundal.  The estimated fetal weight was at the 67th growth percentile for gestational age. The biophysical profile and cord Doppler studies were both reassuring. There was no evidence of absence, or reversal of end-diastolic flow. The ANDREA today was 11.1 cm. The fetus was in the cephalic presentation. The BPP and cord Doppler assessments were both reassuring. There was no evidence of absence, or reversal of end-diastolic flow. GENETIC SCREENING/TERATOLOGY COUNSELING              (Includes patient, FTB, and any affected family members)     Patient Age > 35 Years NO   Thalassemia ( MVC<80) NO   Congential Heart Defect NO   Neural Tube Defect NO   Mainor-Sachs NO   Sickle Cell Disease NO   Sickle Cell Trait NO   Sickle C Disease or Trait NO   Hemophilia NO   Muscular Dystrophy NO   Cystic Fibrosis NO   Drake Disease NO   Autism NO   Mental Retardation NO   History of Fragile X NO   Maternal Diabetes NO   Other Genetic Disease or Syndrome NO   Previous Child With Congenital Abnormality Not Listed NO   Recreational Drugs: Marijuana use YES                                                                  INFECTION HISTORY         HEPATITIS IMMUNIZED:  NO   HEPATITIS INFECTION:  NO   EXPOSURE TO TB NO   PARVOVIRUS B-19 NO   CHICKEN POX  NO   MEASLES NO   HIV NO   OTHER RASH OR VIRAL ILLNESS SINCE LMP NO   UTI RECURRENT NO   HPV NO      OB History    Para Term  AB Living   1             SAB IAB Ectopic Molar Multiple Live Births                      # Outcome Date GA Lbr Cornelio/2nd Weight Sex Delivery Anes PTL Lv   1 Current                        PAST GYNECOLOGICAL  HISTORY:  Negative for abnormal pap smears. Positive for sexually transmitted diseases.  Trichomoniasis    Negative for cervical LEEP / conization /cryosurgery. Negative for uterine surgery. Negative for ovarian or tubal surgery.      Past Medical History   No past medical history on file.        Past Surgical History   No past surgical history on file.        No Known Allergies       Current Outpatient Medications   Medication Sig Dispense Refill    labetalol (NORMODYNE) 100 MG tablet Take 1 tablet by mouth every 8 hours 60 tablet 0    aspirin 81 MG chewable tablet Take 81 mg by mouth daily      Prenat-FeAsp-Meth-FA-DHA w/o A (PRENATE DHA) 18-0.6-0.4-300 MG CAPS Take 1 tablet by mouth daily 30 capsule 11        Social History      Tobacco Use    Smoking status: Current Every Day Smoker       Packs/day: 0.50    Smokeless tobacco: Never Used   Substance Use Topics    Alcohol use: No         FAMILY MEDICAL HISTORY:   Negative for congenital abnormalities, autism, genetic disease and mental retardation, not listed above.      Review of Systems :   CONSTITUTIONAL : No fever, no chills   HEENT : No headache, no visual changes, no rhinorrhea, no sore throat   CARDIOVASCULAR : No pain, no palpitations, no edema   RESPIRATORY : No pain, no shortness of breath   GASTROINTESTINAL : No N/V, no D/C, no abdominal pain   GENITOURINARY : No dysuria, hematuria and no incontinence   MUSCULOSKELETAL : No myalgia, No back pain  NEUROLOGICAL : No numbness, no tingling, no tremors. No history of seizures  ALL OTHER SYSTEMS WERE REPORTED AS NEGATIVE.     PERTINENT PHYSICAL EXAMINATION:   /86   Pulse 93   Wt 192 lb (87.1 kg)   LMP 04/15/2021   BMI 30.07 kg/m²   Negative Glucose  Negative Protein     GENERAL:   The patient is a well developed, female who is alert cooperative and oriented times three in no acute distress.     HEENT:  Normo cephalic and atraumatic. No facial edema.      ABDOMEN:   Her uterus is gravid. She had no complaint of abdominal pain or tenderness.  The fetal heart rate is 139 bpm. The fetus is in the cephalic presentation which was confirmed by the ultrasound assessment.     EXTREMITIES:  1+ peripheral edema is noted. IMPRESSION:    1.  IUP at 33 weeks 5 days gestation based on her Estimated Date of Delivery: 22    2. Elevated blood pressure in the third trimester with no prior history of HTN    3. Possible urinary tract infection. Culture pending. 4.  Elevated liver enzymes 2021    5. Not vaccinated for COVID-19    6. /109 in Dr[facility] office     7. Taking 81 mg ASA daily    8. Category 1 FHR tracing    9. Blood pressure elevated initially, but normalized with rest. 2021  10. Completed Celestone course 2021    11. Reassuring BPP and cord Doppler testing 2021     PLAN:  I would recommend that the patient count fetal movements and call if she notices any subjective decrease in fetal movements, particularly if there are less than 10 major movements in an hour. Non-stress testing should be performed every 3 to 4 days through the balance of the pregnancy. Serial ultrasounds to assess fetal anatomy and growth should be performed. The patient is at increase risk for  morbidity and mortality secondary to her history. Weekly BPP and cord Doppler testing should be performed, unless there is a clinical indication to perform the testing more frequently. The patient is to call is she has any increased vaginal discharge, vaginal bleeding, contractions, abdominal pain, back pain or any new significant symptomatology prior to her next visit. I advised her that these are signs and symptoms of cervical change and require follow-up assessment when they occur. The patient is to continue taking labetalol, to 100 mg every 8 hours. The patient is to return to our office for follow-up assessment in 4 days unless she has a clinical indication return prior to that time.     The total time in minutes spent with the patient, reviewing medical records, reviewing imaging studies, performing ultrasonic imaging, reviewing laboratory testing, and documenting information was 20 minutes, of which, 50% of the time was spent in patient education, counseling, and coordinating care with the patient, her provider, and/or her family. Available electronic and paper medical records were reviewed. She signed out of the hospital [address] on 11/25/2021. She understands the potential risks associated with this action. I recommended that she be off work for the balance of her pregnancy secondary to her elevated blood pressures. I answered all of her questions to her satisfaction. I asked her to call if she had any additional questions prior to her next visit.       The patient is to continue to follow with you in your office for ongoing prenatal care.     Further evaluation and management will be dependent on the results of the patient's testing and her clinical presentation.     If you have any questions regarding her management, please contact me at your convenience and thank you for allowing me to participate in her care.     Sincerely,           Valentine Moura MD, [address], Adia Rutledge, [address], [facility] [place], [facility]  Director [address] [place] [address]  [phone]

## 2021-12-13 NOTE — PATIENT INSTRUCTIONS
Patient Education        Learning About When to Call Your Doctor During Pregnancy (After 20 Weeks)  Overview  It's common to have concerns about what might be a problem when you're pregnant. Most pregnancies don't have any serious problems. But it's still important to know when to call your doctor if you have certain symptoms or signs of labor. These are general suggestions. Your doctor may give you some more information about when to call. When to call your doctor (after 20 weeks)  Call 911  anytime you think you may need emergency care. For example, call if:  · You have severe vaginal bleeding. · You have sudden, severe pain in your belly. · You passed out (lost consciousness). · You have a seizure. · You see or feel the umbilical cord. · You think you are about to deliver your baby and can't make it safely to the hospital.  Call your doctor now or seek immediate medical care if:  · You have vaginal bleeding. · You have belly pain. · You have a fever. · You have symptoms of preeclampsia, such as:  ? Sudden swelling of your face, hands, or feet. ? New vision problems (such as dimness, blurring, or seeing spots). ? A severe headache. · You have a sudden release of fluid from your vagina. (You think your water broke.)  · You think that you may be in labor. This means that you've had at least 6 contractions in an hour. · You notice that your baby has stopped moving or is moving much less than normal.  · You have symptoms of a urinary tract infection. These may include:  ? Pain or burning when you urinate. ? A frequent need to urinate without being able to pass much urine. ? Pain in the flank, which is just below the rib cage and above the waist on either side of the back. ? Blood in your urine. Watch closely for changes in your health, and be sure to contact your doctor if:  · You have vaginal discharge that smells bad. · You have skin changes, such as:  ? A rash. ? Itching.   ? Yellow color to your skin. · You have other concerns about your pregnancy. If you have labor signs at 37 weeks or more  If you have signs of labor at 37 weeks or more, your doctor may tell you to call when your labor becomes more active. Symptoms of active labor include:  · Contractions that are regular. · Contractions that are less than 5 minutes apart. · Contractions that are hard to talk through. Follow-up care is a key part of your treatment and safety. Be sure to make and go to all appointments, and call your doctor if you are having problems. It's also a good idea to know your test results and keep a list of the medicines you take. Where can you learn more? Go to https://St. RenatuspeFood Runner.WildTangent. org and sign in to your Koinify account. Enter  in the TextÃ¡do box to learn more about \"Learning About When to Call Your Doctor During Pregnancy (After 20 Weeks). \"     If you do not have an account, please click on the \"Sign Up Now\" link. Current as of: June 16, 2021               Content Version: 13.0  © 2620-0008 Healthwise, Incorporated. Care instructions adapted under license by [facility] ([facility]). If you have questions about a medical condition or this instruction, always ask your healthcare professional. [address] any warranty or liability for your use of this information.

## 2021-12-14 LAB
GLUCOSE URINE, POC: NORMAL
PROTEIN UA: NEGATIVE

## 2021-12-17 ENCOUNTER — ROUTINE PRENATAL (OUTPATIENT)
Dept: OBGYN CLINIC | Age: 31
End: 2021-12-17
Payer: COMMERCIAL

## 2021-12-17 ENCOUNTER — ANCILLARY PROCEDURE (OUTPATIENT)
Dept: OBGYN CLINIC | Age: 31
End: 2021-12-17
Payer: COMMERCIAL

## 2021-12-17 VITALS
HEART RATE: 93 BPM | WEIGHT: 195.5 LBS | BODY MASS INDEX: 30.62 KG/M2 | SYSTOLIC BLOOD PRESSURE: 132 MMHG | DIASTOLIC BLOOD PRESSURE: 87 MMHG

## 2021-12-17 DIAGNOSIS — O16.3 ELEVATED BLOOD PRESSURE AFFECTING PREGNANCY IN THIRD TRIMESTER, ANTEPARTUM: Primary | ICD-10-CM

## 2021-12-17 LAB
GLUCOSE URINE, POC: NORMAL
PROTEIN UA: POSITIVE

## 2021-12-17 PROCEDURE — 81002 URINALYSIS NONAUTO W/O SCOPE: CPT | Performed by: OBSTETRICS & GYNECOLOGY

## 2021-12-17 PROCEDURE — 99213 OFFICE O/P EST LOW 20 MIN: CPT | Performed by: OBSTETRICS & GYNECOLOGY

## 2021-12-17 PROCEDURE — 1036F TOBACCO NON-USER: CPT | Performed by: OBSTETRICS & GYNECOLOGY

## 2021-12-17 PROCEDURE — G8484 FLU IMMUNIZE NO ADMIN: HCPCS | Performed by: OBSTETRICS & GYNECOLOGY

## 2021-12-17 PROCEDURE — G8419 CALC BMI OUT NRM PARAM NOF/U: HCPCS | Performed by: OBSTETRICS & GYNECOLOGY

## 2021-12-17 PROCEDURE — 76818 FETAL BIOPHYS PROFILE W/NST: CPT | Performed by: OBSTETRICS & GYNECOLOGY

## 2021-12-17 PROCEDURE — G8427 DOCREV CUR MEDS BY ELIG CLIN: HCPCS | Performed by: OBSTETRICS & GYNECOLOGY

## 2021-12-17 RX ORDER — LABETALOL 200 MG/1
200 TABLET, FILM COATED ORAL 3 TIMES DAILY
Qty: 90 TABLET | Refills: 3 | Status: SHIPPED | OUTPATIENT
Start: 2021-12-17

## 2021-12-17 NOTE — PATIENT INSTRUCTIONS
Please arrive for your scheduled appointment at least 15 minutes early with your actual insurance card+ a photo ID. Also if you need any refills ordered or have questions, it may take up 48 hours to reply. Please allow ample time for your refills. Call me when you use last refill. Thank you for your cooperation. Call your primary obstetrician with bleeding, leaking of fluid, abdominal tenderness, headache, blurry vision, epigastric pain and increased urinary frequency. If you are experiencing an emergency and need immediate help, call 911 or go to go emergency room or labor and delivery. if you are sick, not feeling well or have an infectious process going on please reschedule your appointment by calling [phone]. Also if any family members are not feeling well, please do not bring them to your appointment. We appreciate your cooperation. We are doing this in order to protect our pregnant mothers+ their babies. Do kick counts after dinner. Call your primary obstetrician if less than 10 kicks in 2 hours after dinner. Call your primary obstetrician with bleeding, leaking of fluid, abdominal tenderness, headache, blurry vision, epigastric pain and increased urinary frequency. You might be having an NST at your next appt. Please eat a large snack or breakfast before coming to office. Thank youYou might be having an NST at your next appt. Please eat a large snack or breakfast before coming to office. Thank you  Patient Education        Weeks 34 to 39 of Your Pregnancy: Care Instructions  Overview     By now, your baby and your belly have grown quite large. It's almost time to give birth! Your baby's lungs are almost ready to breathe air. The skull bones are firm enough to protect your baby's head, but soft enough to move down through the birth canal.  You may be feeling excited and happy at times--but also anxious or scared. You might wonder how you'll know if you're in labor or what to expect during labor.  Try to be open and flexible in your expectations of the birth. Because each birth is different, there's no way to know exactly what childbirth will be like for you. Talk to your doctor or midwife about any concerns you have. If you haven't already had the Tdap shot during this pregnancy, talk to your doctor about getting it. It will help protect your  against pertussis infection. In the 36th week, you'll probably have a test for group B streptococcus (GBS). GBS is a common type of bacteria that can live in the vagina and rectum. It can make your baby sick after birth. If you test positive, you will get antibiotics during labor. The medicine will help keep your baby from getting the bacteria. Follow-up care is a key part of your treatment and safety. Be sure to make and go to all appointments, and call your doctor if you are having problems. It's also a good idea to know your test results and keep a list of the medicines you take. How can you care for yourself at home? Learn about pain relief choices  · Pain is different for everyone. Talk with your doctor about your feelings about pain. · You can choose from several types of pain relief. These include medicine, breathing techniques, and comfort measures. You can use more than one option. · If you choose to have pain medicine during labor, talk to your doctor about your options. Some medicines lower anxiety and help with some of the pain. Others make your lower body numb so that you won't feel pain. · Be sure to tell your doctor about your pain medicine choice before you start labor or very early in your labor. You may be able to change your mind as labor progresses. Labor and delivery  · The first stage of labor has three parts: early, active, and transition. ? It's common to have early labor at home. You can stay busy or rest, eat light snacks, drink clear fluids, and start counting contractions. ?  When talking during a contraction gets hard, you may be moving to active labor. During active labor, you should head for the hospital if you aren't there already. ? You are in active labor when contractions come every 3 to 4 minutes and last about 60 seconds. Your cervix is opening more rapidly. ? If your water breaks, contractions will come faster and stronger. ? During transition, your cervix is stretching, and contractions are coming more rapidly. ? You may want to push, but your cervix might not be ready. Your doctor will tell you when to push. · The second stage starts when your cervix is completely opened and you are ready to push. ? Contractions are very strong to push the baby down the birth canal.  ? You will probably feel the urge to push. You may feel like you need to have a bowel movement. ? You may be coached to push with contractions. These contractions will be very strong, but you won't have them as often. You can get a little rest between contractions. ? One last push, and your baby is born. · The third stage is when a few more contractions push out the placenta. This may take 30 minutes or less. Where can you learn more? Go to https://CPM BraxispeWinView.LocoX.com. org and sign in to your Mattermark account. Enter C285 in the Criers Podium box to learn more about \"Weeks 34 to 36 of Your Pregnancy: Care Instructions. \"     If you do not have an account, please click on the \"Sign Up Now\" link. Current as of: June 16, 2021               Content Version: 13.0  © 8032-2602 Aveso. Care instructions adapted under license by [facility] ([facility]). If you have questions about a medical condition or this instruction, always ask your healthcare professional. [place] [address] any warranty or liability for your use of this information.          Patient Education        Learning About When to Call Your Doctor During Pregnancy (After 20 Weeks)  Overview  It's common to have concerns about what might be a problem when you're pregnant. Most pregnancies don't have any serious problems. But it's still important to know when to call your doctor if you have certain symptoms or signs of labor. These are general suggestions. Your doctor may give you some more information about when to call. When to call your doctor (after 20 weeks)  Call 911  anytime you think you may need emergency care. For example, call if:  · You have severe vaginal bleeding. · You have sudden, severe pain in your belly. · You passed out (lost consciousness). · You have a seizure. · You see or feel the umbilical cord. · You think you are about to deliver your baby and can't make it safely to the hospital.  Call your doctor now or seek immediate medical care if:  · You have vaginal bleeding. · You have belly pain. · You have a fever. · You have symptoms of preeclampsia, such as:  ? Sudden swelling of your face, hands, or feet. ? New vision problems (such as dimness, blurring, or seeing spots). ? A severe headache. · You have a sudden release of fluid from your vagina. (You think your water broke.)  · You think that you may be in labor. This means that you've had at least 6 contractions in an hour. · You notice that your baby has stopped moving or is moving much less than normal.  · You have symptoms of a urinary tract infection. These may include:  ? Pain or burning when you urinate. ? A frequent need to urinate without being able to pass much urine. ? Pain in the flank, which is just below the rib cage and above the waist on either side of the back. ? Blood in your urine. Watch closely for changes in your health, and be sure to contact your doctor if:  · You have vaginal discharge that smells bad. · You have skin changes, such as:  ? A rash. ? Itching. ? Yellow color to your skin. · You have other concerns about your pregnancy.   If you have labor signs at 37 weeks or more  If you have signs of labor at 37 weeks or more, your doctor may tell you to call when your labor becomes more active. Symptoms of active labor include:  · Contractions that are regular. · Contractions that are less than 5 minutes apart. · Contractions that are hard to talk through. Follow-up care is a key part of your treatment and safety. Be sure to make and go to all appointments, and call your doctor if you are having problems. It's also a good idea to know your test results and keep a list of the medicines you take. Where can you learn more? Go to https://frentingpeAirex Energy.Everyclick. org and sign in to your Bringg account. Enter  in the [facility] box to learn more about \"Learning About When to Call Your Doctor During Pregnancy (After 20 Weeks). \"     If you do not have an account, please click on the \"Sign Up Now\" link. Current as of: June 16, 2021               Content Version: 13.0  © 9151-0030 Healthwise, Incorporated. Care instructions adapted under license by [facility] ([facility]). If you have questions about a medical condition or this instruction, always ask your healthcare professional. [address] any warranty or liability for your use of this information.

## 2021-12-17 NOTE — PROGRESS NOTES
21    Magdiel Brewer MD  [address] [facility], [address] [place] [address]                RE:  Tawana Cavazos  : 1990   AGE: 32 y.o.     This report has been created using voice recognition software. It may contain errors which are inherent in voice recognition technology.     Dear Dr. Kathy Gutierrez:  Onel Johnson had a consultation today for the following indications:     Patient Active Problem List   Diagnosis    Elevated blood pressure affecting pregnancy in third trimester, antepartum      Tita Hennessy is a 32 y.o. female, who is G1(0). She has an Estimated Date of Delivery: 22 based on her previous ultrasound assessment. She is currently 34 weeks 2 days gestation based on that assessment. The patient signed out of the hospital [address] on 2021 understanding the potential increased risk to her and her fetus by leaving the hospital.  She was admitted for elevated liver enzymes and high blood pressure. Her reported blood pressure Dr. Colten Echavarria office prior to admission was 168/109. The patient was initially prescribed, labetalol 50 mg every 12 hours. This was increased to 200 mg every 8 hours. Her initial blood pressure today was elevated. Her initial blood pressure was 145/91. The repeat assessment was 132/87. She had no symptoms of hypertension. She has had no vaginal bleeding or leaking of amniotic fluid. She had no complaint of abdominal pain or tenderness. She has lower extremity edema for the past month. Her fetal movement has been good.      The patient traveled to [facility] 2021-2021. She stated that she had a negative Covid antibody screen when returning to the [place]. The NST today was reactive with moderate variability and accelerations.       A fetal ultrasound evaluation was performed on 12/10/2021. A detailed report included in the EMR under the imaging tab from today's date.   A living borrego intrauterine fetus was identified in the cephalic presentation, with normal fetal heart motion and normal fetal motion noted. The amniotic fluid index was 9.2 cm. The placenta was posterior and fundal.  The estimated fetal weight was at the 67th growth percentile for gestational age. The biophysical profile and cord Doppler studies were both reassuring. There was no evidence of absence, or reversal of end-diastolic flow. The ANDREA today was 12.2 cm. The fetus was in the cephalic presentation. The BPP and cord Doppler assessments were both reassuring. There was no evidence of absence, or reversal of end-diastolic flow. GENETIC SCREENING/TERATOLOGY COUNSELING              (Includes patient, FTB, and any affected family members)     Patient Age > 35 Years NO   Thalassemia ( MVC<80) NO   Congential Heart Defect NO   Neural Tube Defect NO   Mainor-Sachs NO   Sickle Cell Disease NO   Sickle Cell Trait NO   Sickle C Disease or Trait NO   Hemophilia NO   Muscular Dystrophy NO   Cystic Fibrosis NO   Hills Disease NO   Autism NO   Mental Retardation NO   History of Fragile X NO   Maternal Diabetes NO   Other Genetic Disease or Syndrome NO   Previous Child With Congenital Abnormality Not Listed NO   Recreational Drugs: Marijuana use YES                                                                  INFECTION HISTORY         HEPATITIS IMMUNIZED:  NO   HEPATITIS INFECTION:  NO   EXPOSURE TO TB NO   PARVOVIRUS B-19 NO   CHICKEN POX  NO   MEASLES NO   HIV NO   OTHER RASH OR VIRAL ILLNESS SINCE LMP NO   UTI RECURRENT NO   HPV NO      OB History    Para Term  AB Living   1             SAB IAB Ectopic Molar Multiple Live Births                      # Outcome Date GA Lbr Cornelio/2nd Weight Sex Delivery Anes PTL Lv   1 Current                        PAST GYNECOLOGICAL  HISTORY:  Negative for abnormal pap smears. Positive for sexually transmitted diseases.  Trichomoniasis    Negative for cervical LEEP / conization /cryosurgery. Negative for uterine surgery. Negative for ovarian or tubal surgery.      Past Medical History   No past medical history on file.        Past Surgical History   No past surgical history on file.        No Known Allergies       Current Outpatient Medications   Medication Sig Dispense Refill    labetalol (NORMODYNE) 100 MG tablet Take 1 tablet by mouth every 8 hours 60 tablet 0    aspirin 81 MG chewable tablet Take 81 mg by mouth daily      Prenat-FeAsp-Meth-FA-DHA w/o A (PRENATE DHA) 18-0.6-0.4-300 MG CAPS Take 1 tablet by mouth daily 30 capsule 11        Social History      Tobacco Use    Smoking status: Current Every Day Smoker       Packs/day: 0.50    Smokeless tobacco: Never Used   Substance Use Topics    Alcohol use: No         FAMILY MEDICAL HISTORY:   Negative for congenital abnormalities, autism, genetic disease and mental retardation, not listed above.      Review of Systems :   CONSTITUTIONAL : No fever, no chills   HEENT : No headache, no visual changes, no rhinorrhea, no sore throat   CARDIOVASCULAR : No pain, no palpitations, no edema   RESPIRATORY : No pain, no shortness of breath   GASTROINTESTINAL : No N/V, no D/C, no abdominal pain   GENITOURINARY : No dysuria, hematuria and no incontinence   MUSCULOSKELETAL : No myalgia, No back pain  NEUROLOGICAL : No numbness, no tingling, no tremors. No history of seizures  ALL OTHER SYSTEMS WERE REPORTED AS NEGATIVE.     PERTINENT PHYSICAL EXAMINATION:   /87 (Position: Sitting)   Pulse 93   Wt 195 lb 8 oz (88.7 kg)   LMP 04/15/2021   BMI 30.62 kg/m²   Negative Glucose  Negative Protein     GENERAL:   The patient is a well developed, female who is alert cooperative and oriented times three in no acute distress.     HEENT:  Normo cephalic and atraumatic. No facial edema.      ABDOMEN:   Her uterus is gravid. She had no complaint of abdominal pain or tenderness.  The fetal heart rate is 141 bpm. The fetus is in the cephalic presentation which was confirmed by the ultrasound assessment.     EXTREMITIES:  1+ peripheral edema is noted. IMPRESSION:    1.  IUP at 34 weeks 2 days gestation based on her Estimated Date of Delivery: 22    2. Elevated blood pressure in the third trimester with no prior history of HTN    3. Possible urinary tract infection. Culture pending. 4.  Elevated liver enzymes 2021    5. Not vaccinated for COVID-19    6. /109 in Dr. Chuy Harmon office     7. Taking 81 mg ASA daily    8. Category 1 FHR tracing    9. Blood pressure elevated initially, but normalized with rest. 2021  10. Completed Celestone course 2021    11. Reassuring BPP and cord Doppler testing 2021     PLAN:  I would recommend that the patient count fetal movements and call if she notices any subjective decrease in fetal movements, particularly if there are less than 10 major movements in an hour. Non-stress testing should be performed every 3 to 4 days through the balance of the pregnancy. Serial ultrasounds to assess fetal anatomy and growth should be performed. The patient is at increase risk for  morbidity and mortality secondary to her history. Weekly BPP and cord Doppler testing should be performed, unless there is a clinical indication to perform the testing more frequently. The patient is to call is she has any increased vaginal discharge, vaginal bleeding, contractions, abdominal pain, back pain or any new significant symptomatology prior to her next visit. I advised her that these are signs and symptoms of cervical change and require follow-up assessment when they occur. The patient is to continue taking labetalol, to 200 mg every 8 hours. The patient is to return to our office for follow-up assessment in 1 week unless she has a clinical indication return prior to that time.     The total time in minutes spent with the patient, reviewing medical records, reviewing imaging studies, performing ultrasonic imaging, reviewing laboratory testing, and documenting information was 20 minutes, of which, 50% of the time was spent in patient education, counseling, and coordinating care with the patient, her provider, and/or her family. Available electronic and paper medical records were reviewed. She signed out of the hospital [address] on 11/25/2021. She understands the potential risks associated with this action. I recommended that she be off work for the balance of her pregnancy secondary to her elevated blood pressures. I answered all of her questions to her satisfaction. I asked her to call if she had any additional questions prior to her next visit.       The patient is to continue to follow with you in your office for ongoing prenatal care.     Further evaluation and management will be dependent on the results of the patient's testing and her clinical presentation.     If you have any questions regarding her management, please contact me at your convenience and thank you for allowing me to participate in her care.     Sincerely,           Jenny Ferraro MD, [address], [place], [facility],  Faith Burnham RVT  Director [address] [place] [address]  [phone]

## 2021-12-17 NOTE — PROGRESS NOTES
/106 on her machine after I took 145/91  Pt to start taking labetolol 200mg every 8 hours but needs new prescription for this dosage from Dr Santos Azul  Pt states good fetal movement  Pt denies any complaints of contractions/bleeding/headache/blurred vision/epigastric pain

## 2021-12-21 ENCOUNTER — TELEPHONE (OUTPATIENT)
Dept: OBGYN CLINIC | Age: 31
End: 2021-12-21

## 2021-12-21 NOTE — TELEPHONE ENCOUNTER
Pt called regarding over due labs. She states that she had some blood work done @ Dr Lowe Begun office and told Dr Torrie Coe at her last appt. She states that she is going to call their office to get results.  Dates extended

## 2021-12-23 ENCOUNTER — ROUTINE PRENATAL (OUTPATIENT)
Dept: OBGYN CLINIC | Age: 31
End: 2021-12-23
Payer: COMMERCIAL

## 2021-12-23 ENCOUNTER — ANCILLARY PROCEDURE (OUTPATIENT)
Dept: OBGYN CLINIC | Age: 31
End: 2021-12-23
Payer: COMMERCIAL

## 2021-12-23 VITALS
BODY MASS INDEX: 30.9 KG/M2 | SYSTOLIC BLOOD PRESSURE: 132 MMHG | DIASTOLIC BLOOD PRESSURE: 89 MMHG | HEART RATE: 96 BPM | WEIGHT: 197.31 LBS

## 2021-12-23 DIAGNOSIS — O16.3 ELEVATED BLOOD PRESSURE AFFECTING PREGNANCY IN THIRD TRIMESTER, ANTEPARTUM: Primary | ICD-10-CM

## 2021-12-23 LAB
GLUCOSE URINE, POC: NORMAL
PROTEIN UA: NEGATIVE

## 2021-12-23 PROCEDURE — 76818 FETAL BIOPHYS PROFILE W/NST: CPT | Performed by: OBSTETRICS & GYNECOLOGY

## 2021-12-23 PROCEDURE — G8427 DOCREV CUR MEDS BY ELIG CLIN: HCPCS | Performed by: OBSTETRICS & GYNECOLOGY

## 2021-12-23 PROCEDURE — 81002 URINALYSIS NONAUTO W/O SCOPE: CPT | Performed by: OBSTETRICS & GYNECOLOGY

## 2021-12-23 PROCEDURE — 1036F TOBACCO NON-USER: CPT | Performed by: OBSTETRICS & GYNECOLOGY

## 2021-12-23 PROCEDURE — 99213 OFFICE O/P EST LOW 20 MIN: CPT | Performed by: OBSTETRICS & GYNECOLOGY

## 2021-12-23 PROCEDURE — G8484 FLU IMMUNIZE NO ADMIN: HCPCS | Performed by: OBSTETRICS & GYNECOLOGY

## 2021-12-23 PROCEDURE — 76820 UMBILICAL ARTERY ECHO: CPT | Performed by: OBSTETRICS & GYNECOLOGY

## 2021-12-23 PROCEDURE — G8419 CALC BMI OUT NRM PARAM NOF/U: HCPCS | Performed by: OBSTETRICS & GYNECOLOGY

## 2021-12-23 NOTE — PATIENT INSTRUCTIONS
Please arrive for your scheduled appointment at least 15 minutes early with your actual insurance card+ a photo ID. Also if you need any refills ordered or have questions, it may take up 48 hours to reply. Please allow ample time for your refills. Call me when you use last refill. Thank you for your cooperation. Call your primary obstetrician with bleeding, leaking of fluid, abdominal tenderness, headache, blurry vision, epigastric pain and increased urinary frequency. if you are sick, not feeling well or have an infectious process going on please reschedule your appointment by calling [phone]. Also if any family members are not feeling well, please do not bring them to your appointment. We appreciate your cooperation. We are doing this in order to protect our pregnant mothers+ their babies. If you are experiencing an emergency and need immediate help, call 911 or go to go emergency room or labor and delivery. Do kick counts after dinner. Call your primary obstetrician if less than 10 kicks in 2 hours after dinner. Call your primary obstetrician with bleeding, leaking of fluid, abdominal tenderness, headache, blurry vision, epigastric pain and increased urinary frequency. You might be having an NST at your next appt. Please eat a large snack or breakfast before coming to office. Thank youYou might be having an NST at your next appt. Please eat a large snack or breakfast before coming to office. Thank you  Patient Education        Weeks 34 to 39 of Your Pregnancy: Care Instructions  Overview     By now, your baby and your belly have grown quite large. It's almost time to give birth! Your baby's lungs are almost ready to breathe air. The skull bones are firm enough to protect your baby's head, but soft enough to move down through the birth canal.  You may be feeling excited and happy at times--but also anxious or scared. You might wonder how you'll know if you're in labor or what to expect during labor.  Try to be open and flexible in your expectations of the birth. Because each birth is different, there's no way to know exactly what childbirth will be like for you. Talk to your doctor or midwife about any concerns you have. If you haven't already had the Tdap shot during this pregnancy, talk to your doctor about getting it. It will help protect your  against pertussis infection. In the 36th week, you'll probably have a test for group B streptococcus (GBS). GBS is a common type of bacteria that can live in the vagina and rectum. It can make your baby sick after birth. If you test positive, you will get antibiotics during labor. The medicine will help keep your baby from getting the bacteria. Follow-up care is a key part of your treatment and safety. Be sure to make and go to all appointments, and call your doctor if you are having problems. It's also a good idea to know your test results and keep a list of the medicines you take. How can you care for yourself at home? Learn about pain relief choices  · Pain is different for everyone. Talk with your doctor about your feelings about pain. · You can choose from several types of pain relief. These include medicine, breathing techniques, and comfort measures. You can use more than one option. · If you choose to have pain medicine during labor, talk to your doctor about your options. Some medicines lower anxiety and help with some of the pain. Others make your lower body numb so that you won't feel pain. · Be sure to tell your doctor about your pain medicine choice before you start labor or very early in your labor. You may be able to change your mind as labor progresses. Labor and delivery  · The first stage of labor has three parts: early, active, and transition. ? It's common to have early labor at home. You can stay busy or rest, eat light snacks, drink clear fluids, and start counting contractions. ?  When talking during a contraction gets hard, you may be moving to active labor. During active labor, you should head for the hospital if you aren't there already. ? You are in active labor when contractions come every 3 to 4 minutes and last about 60 seconds. Your cervix is opening more rapidly. ? If your water breaks, contractions will come faster and stronger. ? During transition, your cervix is stretching, and contractions are coming more rapidly. ? You may want to push, but your cervix might not be ready. Your doctor will tell you when to push. · The second stage starts when your cervix is completely opened and you are ready to push. ? Contractions are very strong to push the baby down the birth canal.  ? You will probably feel the urge to push. You may feel like you need to have a bowel movement. ? You may be coached to push with contractions. These contractions will be very strong, but you won't have them as often. You can get a little rest between contractions. ? One last push, and your baby is born. · The third stage is when a few more contractions push out the placenta. This may take 30 minutes or less. Where can you learn more? Go to https://FanXTpevSocial.Contentment Ltd. org and sign in to your [email or web address].tv account. Enter M859 in the [employer] box to learn more about \"Weeks 34 to 36 of Your Pregnancy: Care Instructions. \"     If you do not have an account, please click on the \"Sign Up Now\" link. Current as of: June 16, 2021               Content Version: 13.1  © 0350-4069 [employer]. Care instructions adapted under license by [facility] ([facility]). If you have questions about a medical condition or this instruction, always ask your healthcare professional. [place] [address] any warranty or liability for your use of this information.          Patient Education        Learning About When to Call Your Doctor During Pregnancy (After 20 Weeks)  Overview  It's common to have concerns about what might be a problem when you're pregnant. Most pregnancies don't have any serious problems. But it's still important to know when to call your doctor if you have certain symptoms or signs of labor. These are general suggestions. Your doctor may give you some more information about when to call. When to call your doctor (after 20 weeks)  Call 911  anytime you think you may need emergency care. For example, call if:  · You have severe vaginal bleeding. · You have sudden, severe pain in your belly. · You passed out (lost consciousness). · You have a seizure. · You see or feel the umbilical cord. · You think you are about to deliver your baby and can't make it safely to the hospital.  Call your doctor now or seek immediate medical care if:  · You have vaginal bleeding. · You have belly pain. · You have a fever. · You have symptoms of preeclampsia, such as:  ? Sudden swelling of your face, hands, or feet. ? New vision problems (such as dimness, blurring, or seeing spots). ? A severe headache. · You have a sudden release of fluid from your vagina. (You think your water broke.)  · You think that you may be in labor. This means that you've had at least 6 contractions in an hour. · You notice that your baby has stopped moving or is moving much less than normal.  · You have symptoms of a urinary tract infection. These may include:  ? Pain or burning when you urinate. ? A frequent need to urinate without being able to pass much urine. ? Pain in the flank, which is just below the rib cage and above the waist on either side of the back. ? Blood in your urine. Watch closely for changes in your health, and be sure to contact your doctor if:  · You have vaginal discharge that smells bad. · You have skin changes, such as:  ? A rash. ? Itching. ? Yellow color to your skin. · You have other concerns about your pregnancy.   If you have labor signs at 37 weeks or more  If you have signs of labor at 37 weeks or more, your doctor may tell you to call when your labor becomes more active. Symptoms of active labor include:  · Contractions that are regular. · Contractions that are less than 5 minutes apart. · Contractions that are hard to talk through. Follow-up care is a key part of your treatment and safety. Be sure to make and go to all appointments, and call your doctor if you are having problems. It's also a good idea to know your test results and keep a list of the medicines you take. Where can you learn more? Go to https://R-HealthpeUltius.RJMetrics. org and sign in to your [employer] account. Enter  in the [facility] box to learn more about \"Learning About When to Call Your Doctor During Pregnancy (After 20 Weeks). \"     If you do not have an account, please click on the \"Sign Up Now\" link. Current as of: June 16, 2021               Content Version: 13.1  © 3302-6053 Healthwise, Incorporated. Care instructions adapted under license by [facility] ([facility]). If you have questions about a medical condition or this instruction, always ask your healthcare professional. [address] any warranty or liability for your use of this information.

## 2021-12-23 NOTE — PROGRESS NOTES
PT here for BPP/NST  Pt states good fetal movement  Pt denies any contractions/bleeding/lof  Pt denies any headache/blurred vision/epigastric pain

## 2021-12-23 NOTE — PROGRESS NOTES
21    Frank Moss MD  [address] [facility], [address] [facility]                RE:  Randolph Enriquez  : 1990   AGE: 32 y.o.     This report has been created using voice recognition software. It may contain errors which are inherent in voice recognition technology.     Dear Dr. Nakul Alva:  Rhonda Vázquez had an appointment today for the following indications:     Patient Active Problem List   Diagnosis    Elevated blood pressure affecting pregnancy in third trimester, antepartum      Charmayne Osier is a 32 y.o. female, who is G1(0). She has an Estimated Date of Delivery: 22 based on her previous ultrasound assessment. She is currently 35 weeks 1 days gestation based on that assessment. The patient signed out of the hospital [address] on 2021 understanding the potential increased risk to her and her fetus by leaving the hospital.  She was admitted for elevated liver enzymes and high blood pressure. Her reported blood pressure Dr. Thuy Velasco office prior to admission was 168/109. The patient was initially prescribed, labetalol 50 mg every 12 hours. This was increased to 200 mg every 8 hours. Her initial blood pressure today was elevated. Her initial blood pressure was 145/91. The repeat assessment was 132/87. She had no symptoms of hypertension. She has had no vaginal bleeding or leaking of amniotic fluid. She had no complaint of abdominal pain or tenderness. She has lower extremity edema for the past month. Her fetal movement has been good.      The patient traveled to [facility] 2021-2021. She stated that she had a negative Covid antibody screen when returning to the [facility]. The NST today was reactive with moderate variability and accelerations.       A fetal ultrasound evaluation was performed on 12/10/2021. A detailed report included in the EMR under the imaging tab from today's date.   A living borrego intrauterine fetus was identified in the cephalic presentation, with normal fetal heart motion and normal fetal motion noted. The amniotic fluid index was 9.2 cm. The placenta was posterior and fundal.  The estimated fetal weight was at the 67th growth percentile for gestational age. The biophysical profile and cord Doppler studies were both reassuring. There was no evidence of absence, or reversal of end-diastolic flow. The ANDREA today was 15.1 cm. The fetus was in the cephalic presentation. The BPP and cord Doppler assessments were both reassuring. There was no evidence of absence, or reversal of end-diastolic flow. GENETIC SCREENING/TERATOLOGY COUNSELING              (Includes patient, FTB, and any affected family members)     Patient Age > 35 Years NO   Thalassemia ( MVC<80) NO   Congential Heart Defect NO   Neural Tube Defect NO   Mainor-Sachs NO   Sickle Cell Disease NO   Sickle Cell Trait NO   Sickle C Disease or Trait NO   Hemophilia NO   Muscular Dystrophy NO   Cystic Fibrosis NO   Grandin Disease NO   Autism NO   Mental Retardation NO   History of Fragile X NO   Maternal Diabetes NO   Other Genetic Disease or Syndrome NO   Previous Child With Congenital Abnormality Not Listed NO   Recreational Drugs: Marijuana use YES                                                                  INFECTION HISTORY         HEPATITIS IMMUNIZED:  NO   HEPATITIS INFECTION:  NO   EXPOSURE TO TB NO   PARVOVIRUS B-19 NO   CHICKEN POX  NO   MEASLES NO   HIV NO   OTHER RASH OR VIRAL ILLNESS SINCE LMP NO   UTI RECURRENT NO   HPV NO      OB History    Para Term  AB Living   1             SAB IAB Ectopic Molar Multiple Live Births                      # Outcome Date GA Lbr Cornelio/2nd Weight Sex Delivery Anes PTL Lv   1 Current                        PAST GYNECOLOGICAL  HISTORY:  Negative for abnormal pap smears. Positive for sexually transmitted diseases.  Trichomoniasis    Negative for cervical LEEP / conization /cryosurgery. Negative for uterine surgery. Negative for ovarian or tubal surgery.      Past Medical History   No past medical history on file.        Past Surgical History   No past surgical history on file. No Known Allergies     Current Outpatient Medications   Medication Sig Dispense Refill    labetalol (NORMODYNE) 200 MG tablet Take 1 tablet by mouth every 8 hours 60 tablet 0    aspirin 81 MG chewable tablet Take 81 mg by mouth daily      Prenat-FeAsp-Meth-FA-DHA w/o A (PRENATE DHA) 18-0.6-0.4-300 MG CAPS Take 1 tablet by mouth daily 30 capsule 11        Social History      Tobacco Use    Smoking status: Current Every Day Smoker       Packs/day: 0.50    Smokeless tobacco: Never Used   Substance Use Topics    Alcohol use: No         FAMILY MEDICAL HISTORY:   Negative for congenital abnormalities, autism, genetic disease and mental retardation, not listed above.      Review of Systems :   CONSTITUTIONAL : No fever, no chills   HEENT : No headache, no visual changes, no rhinorrhea, no sore throat   CARDIOVASCULAR : No pain, no palpitations, no edema   RESPIRATORY : No pain, no shortness of breath   GASTROINTESTINAL : No N/V, no D/C, no abdominal pain   GENITOURINARY : No dysuria, hematuria and no incontinence   MUSCULOSKELETAL : No myalgia, No back pain  NEUROLOGICAL : No numbness, no tingling, no tremors. No history of seizures  ALL OTHER SYSTEMS WERE REPORTED AS NEGATIVE.     PERTINENT PHYSICAL EXAMINATION:   /89   Pulse 96   Wt 197 lb 5 oz (89.5 kg)   LMP 04/15/2021   BMI 30.90 kg/m²   Negative Glucose  Negative Protein     GENERAL:   The patient is a well developed, female who is alert cooperative and oriented times three in no acute distress.     HEENT:  Normo cephalic and atraumatic. No facial edema.      ABDOMEN:   Her uterus is gravid. She had no complaint of abdominal pain or tenderness.  The fetal heart rate is 150 bpm. The fetus is in the cephalic presentation which was confirmed by the ultrasound assessment.     EXTREMITIES:  1+ peripheral edema is noted. IMPRESSION:    1.  IUP at 35 weeks 1 days gestation based on her Estimated Date of Delivery: 22    2. Elevated blood pressure in the third trimester with no prior history of HTN    3. Possible urinary tract infection. Culture pending. 4.  Elevated liver enzymes 2021    5. Not vaccinated for COVID-19    6. /109 in Dr. Rehana Mariscal office     7. Taking 81 mg ASA daily    8. Category 1 FHR tracing    9. Blood pressure elevated initially, but normalized with rest. 2021  10. Completed Celestone course 2021    11. Reassuring BPP and cord Doppler testing 2021   12. Pre-eclampsia     PLAN:  I would recommend that the patient count fetal movements and call if she notices any subjective decrease in fetal movements, particularly if there are less than 10 major movements in an hour. Non-stress testing should be performed every 3 to 4 days through the balance of the pregnancy. Serial ultrasounds to assess fetal anatomy and growth should be performed. The patient is at increase risk for  morbidity and mortality secondary to her history. Weekly BPP and cord Doppler testing should be performed, unless there is a clinical indication to perform the testing more frequently. The patient is to call is she has any increased vaginal discharge, vaginal bleeding, contractions, abdominal pain, back pain or any new significant symptomatology prior to her next visit. I advised her that these are signs and symptoms of cervical change and require follow-up assessment when they occur. The patient is to continue taking labetalol, to 200 mg every 8 hours. The patient is to return to our office for follow-up assessment in 1 week unless she has a clinical indication return prior to that time.     The total time in minutes spent with the patient, reviewing medical records, reviewing imaging studies, performing ultrasonic imaging, reviewing laboratory testing, and documenting information was 20 minutes, of which, 50% of the time was spent in patient education, counseling, and coordinating care with the patient, her provider, and/or her family. Available electronic and paper medical records were reviewed. She signed out of the hospital [address] on 11/25/2021. She understands the potential risks associated with this action. I recommended that she be off work for the balance of her pregnancy secondary to her elevated blood pressures. I answered all of her questions to her satisfaction. I asked her to call if she had any additional questions prior to her next visit. The patient is to continue to follow with you in your office for ongoing prenatal care.     As we discussed a the time of the patient's assessment, I would plan to deliver the patient at 42 weeks gestational age unless there is a clinical indication for delivery prior to that time.      Further evaluation and management will be dependent on the results of the patient's testing and her clinical presentation.     If you have any questions regarding her management, please contact me at your convenience and thank you for allowing me to participate in her care.     Sincerely,           Dmitry Andrews MD, MS, Dana Almanzar, [address] [facility], [address] [facility]  Director [address] [place] [address]  [phone]

## 2021-12-30 ENCOUNTER — ROUTINE PRENATAL (OUTPATIENT)
Dept: OBGYN CLINIC | Age: 31
End: 2021-12-30
Payer: COMMERCIAL

## 2021-12-30 ENCOUNTER — ANCILLARY PROCEDURE (OUTPATIENT)
Dept: OBGYN CLINIC | Age: 31
End: 2021-12-30
Payer: COMMERCIAL

## 2021-12-30 VITALS
HEART RATE: 99 BPM | SYSTOLIC BLOOD PRESSURE: 130 MMHG | DIASTOLIC BLOOD PRESSURE: 87 MMHG | WEIGHT: 202 LBS | BODY MASS INDEX: 31.64 KG/M2

## 2021-12-30 DIAGNOSIS — Z3A.36 36 WEEKS GESTATION OF PREGNANCY: Primary | ICD-10-CM

## 2021-12-30 DIAGNOSIS — O16.3 ELEVATED BLOOD PRESSURE AFFECTING PREGNANCY IN THIRD TRIMESTER, ANTEPARTUM: ICD-10-CM

## 2021-12-30 LAB
GLUCOSE URINE, POC: NEGATIVE
PROTEIN UA: NEGATIVE

## 2021-12-30 PROCEDURE — 76820 UMBILICAL ARTERY ECHO: CPT | Performed by: OBSTETRICS & GYNECOLOGY

## 2021-12-30 PROCEDURE — 76816 OB US FOLLOW-UP PER FETUS: CPT | Performed by: OBSTETRICS & GYNECOLOGY

## 2021-12-30 PROCEDURE — 99213 OFFICE O/P EST LOW 20 MIN: CPT | Performed by: OBSTETRICS & GYNECOLOGY

## 2021-12-30 PROCEDURE — 76821 MIDDLE CEREBRAL ARTERY ECHO: CPT | Performed by: OBSTETRICS & GYNECOLOGY

## 2021-12-30 PROCEDURE — 81002 URINALYSIS NONAUTO W/O SCOPE: CPT | Performed by: OBSTETRICS & GYNECOLOGY

## 2021-12-30 PROCEDURE — 99999 PR OFFICE/OUTPT VISIT,PROCEDURE ONLY: CPT | Performed by: OBSTETRICS & GYNECOLOGY

## 2021-12-30 PROCEDURE — 76818 FETAL BIOPHYS PROFILE W/NST: CPT | Performed by: OBSTETRICS & GYNECOLOGY

## 2021-12-30 NOTE — PATIENT INSTRUCTIONS
Patient Education        Learning About When to Call Your Doctor During Pregnancy (After 20 Weeks)  Overview  It's common to have concerns about what might be a problem when you're pregnant. Most pregnancies don't have any serious problems. But it's still important to know when to call your doctor if you have certain symptoms or signs of labor. These are general suggestions. Your doctor may give you some more information about when to call. When to call your doctor (after 20 weeks)  Call 911  anytime you think you may need emergency care. For example, call if:  · You have severe vaginal bleeding. · You have sudden, severe pain in your belly. · You passed out (lost consciousness). · You have a seizure. · You see or feel the umbilical cord. · You think you are about to deliver your baby and can't make it safely to the hospital.  Call your doctor now or seek immediate medical care if:  · You have vaginal bleeding. · You have belly pain. · You have a fever. · You have symptoms of preeclampsia, such as:  ? Sudden swelling of your face, hands, or feet. ? New vision problems (such as dimness, blurring, or seeing spots). ? A severe headache. · You have a sudden release of fluid from your vagina. (You think your water broke.)  · You think that you may be in labor. This means that you've had at least 6 contractions in an hour. · You notice that your baby has stopped moving or is moving much less than normal.  · You have symptoms of a urinary tract infection. These may include:  ? Pain or burning when you urinate. ? A frequent need to urinate without being able to pass much urine. ? Pain in the flank, which is just below the rib cage and above the waist on either side of the back. ? Blood in your urine. Watch closely for changes in your health, and be sure to contact your doctor if:  · You have vaginal discharge that smells bad. · You have skin changes, such as:  ? A rash. ? Itching.   ? Yellow color to your skin. · You have other concerns about your pregnancy. If you have labor signs at 37 weeks or more  If you have signs of labor at 37 weeks or more, your doctor may tell you to call when your labor becomes more active. Symptoms of active labor include:  · Contractions that are regular. · Contractions that are less than 5 minutes apart. · Contractions that are hard to talk through. Follow-up care is a key part of your treatment and safety. Be sure to make and go to all appointments, and call your doctor if you are having problems. It's also a good idea to know your test results and keep a list of the medicines you take. Where can you learn more? Go to [email or web address] org and sign in to your [employer] account. Enter  in the [facility] box to learn more about \"Learning About When to Call Your Doctor During Pregnancy (After 20 Weeks). \"     If you do not have an account, please click on the \"Sign Up Now\" link. Current as of: June 16, 2021               Content Version: 13.1  © 2175-5361 Healthwise, Incorporated. Care instructions adapted under license by [facility] ([facility]). If you have questions about a medical condition or this instruction, always ask your healthcare professional. [address] any warranty or liability for your use of this information.

## 2022-01-05 ENCOUNTER — ANESTHESIA (OUTPATIENT)
Dept: LABOR AND DELIVERY | Age: 32
End: 2022-01-05
Payer: COMMERCIAL

## 2022-01-05 ENCOUNTER — HOSPITAL ENCOUNTER (INPATIENT)
Age: 32
LOS: 3 days | Discharge: HOME | End: 2022-01-08
Attending: OBSTETRICS & GYNECOLOGY | Admitting: OBSTETRICS & GYNECOLOGY
Payer: COMMERCIAL

## 2022-01-05 ENCOUNTER — ANESTHESIA EVENT (OUTPATIENT)
Dept: LABOR AND DELIVERY | Age: 32
End: 2022-01-05
Payer: COMMERCIAL

## 2022-01-05 VITALS — DIASTOLIC BLOOD PRESSURE: 65 MMHG | SYSTOLIC BLOOD PRESSURE: 124 MMHG | OXYGEN SATURATION: 100 %

## 2022-01-05 DIAGNOSIS — G89.18 ACUTE POSTOPERATIVE PAIN: Primary | ICD-10-CM

## 2022-01-05 PROBLEM — Z3A.37 37 WEEKS GESTATION OF PREGNANCY: Status: ACTIVE | Noted: 2022-01-05

## 2022-01-05 LAB
ABO/RH: NORMAL
AMPHETAMINE SCREEN, URINE: NOT DETECTED
ANTIBODY SCREEN: NORMAL
BARBITURATE SCREEN URINE: NOT DETECTED
BENZODIAZEPINE SCREEN, URINE: NOT DETECTED
CANNABINOID SCREEN URINE: NOT DETECTED
COCAINE METABOLITE SCREEN URINE: NOT DETECTED
FENTANYL SCREEN, URINE: POSITIVE
HCT VFR BLD CALC: 28.5 % (ref 34–48)
HEMOGLOBIN: 9.8 G/DL (ref 11.5–15.5)
Lab: ABNORMAL
MCH RBC QN AUTO: 31.3 PG (ref 26–35)
MCHC RBC AUTO-ENTMCNC: 34.4 % (ref 32–34.5)
MCV RBC AUTO: 91.1 FL (ref 80–99.9)
METHADONE SCREEN, URINE: NOT DETECTED
OPIATE SCREEN URINE: NOT DETECTED
OXYCODONE URINE: NOT DETECTED
PDW BLD-RTO: 13.2 FL (ref 11.5–15)
PHENCYCLIDINE SCREEN URINE: NOT DETECTED
PLATELET # BLD: 179 E9/L (ref 130–450)
PMV BLD AUTO: 13.2 FL (ref 7–12)
RBC # BLD: 3.13 E12/L (ref 3.5–5.5)
WBC # BLD: 10.1 E9/L (ref 4.5–11.5)

## 2022-01-05 PROCEDURE — 6360000002 HC RX W HCPCS

## 2022-01-05 PROCEDURE — 85027 COMPLETE CBC AUTOMATED: CPT

## 2022-01-05 PROCEDURE — 88307 TISSUE EXAM BY PATHOLOGIST: CPT

## 2022-01-05 PROCEDURE — 3700000000 HC ANESTHESIA ATTENDED CARE: Performed by: OBSTETRICS & GYNECOLOGY

## 2022-01-05 PROCEDURE — 3700000001 HC ADD 15 MINUTES (ANESTHESIA): Performed by: OBSTETRICS & GYNECOLOGY

## 2022-01-05 PROCEDURE — 1220000000 HC SEMI PRIVATE OB R&B

## 2022-01-05 PROCEDURE — 6370000000 HC RX 637 (ALT 250 FOR IP): Performed by: OBSTETRICS & GYNECOLOGY

## 2022-01-05 PROCEDURE — 80307 DRUG TEST PRSMV CHEM ANLYZR: CPT

## 2022-01-05 PROCEDURE — 2500000003 HC RX 250 WO HCPCS

## 2022-01-05 PROCEDURE — 2709999900 HC NON-CHARGEABLE SUPPLY: Performed by: OBSTETRICS & GYNECOLOGY

## 2022-01-05 PROCEDURE — 2580000003 HC RX 258: Performed by: OBSTETRICS & GYNECOLOGY

## 2022-01-05 PROCEDURE — 3609079900 HC CESAREAN SECTION: Performed by: OBSTETRICS & GYNECOLOGY

## 2022-01-05 PROCEDURE — 7100000000 HC PACU RECOVERY - FIRST 15 MIN: Performed by: OBSTETRICS & GYNECOLOGY

## 2022-01-05 PROCEDURE — 86901 BLOOD TYPING SEROLOGIC RH(D): CPT

## 2022-01-05 PROCEDURE — 6370000000 HC RX 637 (ALT 250 FOR IP)

## 2022-01-05 PROCEDURE — P9016 RBC LEUKOCYTES REDUCED: HCPCS

## 2022-01-05 PROCEDURE — 6360000002 HC RX W HCPCS: Performed by: ANESTHESIOLOGY

## 2022-01-05 PROCEDURE — 36415 COLL VENOUS BLD VENIPUNCTURE: CPT

## 2022-01-05 PROCEDURE — 86923 COMPATIBILITY TEST ELECTRIC: CPT

## 2022-01-05 PROCEDURE — 7100000001 HC PACU RECOVERY - ADDTL 15 MIN: Performed by: OBSTETRICS & GYNECOLOGY

## 2022-01-05 PROCEDURE — 86900 BLOOD TYPING SEROLOGIC ABO: CPT

## 2022-01-05 PROCEDURE — 86850 RBC ANTIBODY SCREEN: CPT

## 2022-01-05 PROCEDURE — 6360000002 HC RX W HCPCS: Performed by: OBSTETRICS & GYNECOLOGY

## 2022-01-05 PROCEDURE — G0480 DRUG TEST DEF 1-7 CLASSES: HCPCS

## 2022-01-05 RX ORDER — METOCLOPRAMIDE HYDROCHLORIDE 5 MG/ML
10 INJECTION INTRAMUSCULAR; INTRAVENOUS ONCE
Status: COMPLETED | OUTPATIENT
Start: 2022-01-05 | End: 2022-01-05

## 2022-01-05 RX ORDER — IBUPROFEN 800 MG/1
800 TABLET ORAL EVERY 8 HOURS
Status: DISCONTINUED | OUTPATIENT
Start: 2022-01-06 | End: 2022-01-08 | Stop reason: HOSPADM

## 2022-01-05 RX ORDER — DOCUSATE SODIUM 100 MG/1
100 CAPSULE, LIQUID FILLED ORAL 2 TIMES DAILY
Status: DISCONTINUED | OUTPATIENT
Start: 2022-01-05 | End: 2022-01-08 | Stop reason: HOSPADM

## 2022-01-05 RX ORDER — BUPIVACAINE HYDROCHLORIDE 7.5 MG/ML
INJECTION, SOLUTION INTRASPINAL PRN
Status: DISCONTINUED | OUTPATIENT
Start: 2022-01-05 | End: 2022-01-05 | Stop reason: SDUPTHER

## 2022-01-05 RX ORDER — ONDANSETRON 2 MG/ML
4 INJECTION INTRAMUSCULAR; INTRAVENOUS EVERY 6 HOURS PRN
Status: DISCONTINUED | OUTPATIENT
Start: 2022-01-05 | End: 2022-01-08 | Stop reason: HOSPADM

## 2022-01-05 RX ORDER — SODIUM CHLORIDE, SODIUM LACTATE, POTASSIUM CHLORIDE, CALCIUM CHLORIDE 600; 310; 30; 20 MG/100ML; MG/100ML; MG/100ML; MG/100ML
INJECTION, SOLUTION INTRAVENOUS CONTINUOUS
Status: DISCONTINUED | OUTPATIENT
Start: 2022-01-05 | End: 2022-01-05

## 2022-01-05 RX ORDER — ACETAMINOPHEN 500 MG
1000 TABLET ORAL ONCE
Status: COMPLETED | OUTPATIENT
Start: 2022-01-05 | End: 2022-01-05

## 2022-01-05 RX ORDER — SODIUM CHLORIDE 0.9 % (FLUSH) 0.9 %
5-40 SYRINGE (ML) INJECTION PRN
Status: DISCONTINUED | OUTPATIENT
Start: 2022-01-05 | End: 2022-01-08 | Stop reason: HOSPADM

## 2022-01-05 RX ORDER — SODIUM CHLORIDE 9 MG/ML
25 INJECTION, SOLUTION INTRAVENOUS PRN
Status: DISCONTINUED | OUTPATIENT
Start: 2022-01-05 | End: 2022-01-05

## 2022-01-05 RX ORDER — SODIUM CHLORIDE 0.9 % (FLUSH) 0.9 %
10 SYRINGE (ML) INJECTION EVERY 12 HOURS SCHEDULED
Status: DISCONTINUED | OUTPATIENT
Start: 2022-01-05 | End: 2022-01-05

## 2022-01-05 RX ORDER — SODIUM CHLORIDE 0.9 % (FLUSH) 0.9 %
10 SYRINGE (ML) INJECTION PRN
Status: DISCONTINUED | OUTPATIENT
Start: 2022-01-05 | End: 2022-01-05

## 2022-01-05 RX ORDER — OXYCODONE HYDROCHLORIDE AND ACETAMINOPHEN 5; 325 MG/1; MG/1
2 TABLET ORAL EVERY 4 HOURS PRN
Status: DISCONTINUED | OUTPATIENT
Start: 2022-01-05 | End: 2022-01-08 | Stop reason: HOSPADM

## 2022-01-05 RX ORDER — ONDANSETRON 2 MG/ML
4 INJECTION INTRAMUSCULAR; INTRAVENOUS EVERY 6 HOURS PRN
Status: DISCONTINUED | OUTPATIENT
Start: 2022-01-05 | End: 2022-01-05

## 2022-01-05 RX ORDER — NALOXONE HYDROCHLORIDE 0.4 MG/ML
0.4 INJECTION, SOLUTION INTRAMUSCULAR; INTRAVENOUS; SUBCUTANEOUS PRN
Status: DISCONTINUED | OUTPATIENT
Start: 2022-01-05 | End: 2022-01-08 | Stop reason: HOSPADM

## 2022-01-05 RX ORDER — METOCLOPRAMIDE HYDROCHLORIDE 5 MG/ML
INJECTION INTRAMUSCULAR; INTRAVENOUS
Status: COMPLETED
Start: 2022-01-05 | End: 2022-01-05

## 2022-01-05 RX ORDER — DIPHENHYDRAMINE HYDROCHLORIDE 50 MG/ML
25 INJECTION INTRAMUSCULAR; INTRAVENOUS EVERY 6 HOURS PRN
Status: DISCONTINUED | OUTPATIENT
Start: 2022-01-05 | End: 2022-01-08 | Stop reason: HOSPADM

## 2022-01-05 RX ORDER — SODIUM CHLORIDE, SODIUM LACTATE, POTASSIUM CHLORIDE, CALCIUM CHLORIDE 600; 310; 30; 20 MG/100ML; MG/100ML; MG/100ML; MG/100ML
INJECTION, SOLUTION INTRAVENOUS CONTINUOUS
Status: DISCONTINUED | OUTPATIENT
Start: 2022-01-05 | End: 2022-01-08 | Stop reason: HOSPADM

## 2022-01-05 RX ORDER — ACETAMINOPHEN 500 MG
TABLET ORAL
Status: COMPLETED
Start: 2022-01-05 | End: 2022-01-05

## 2022-01-05 RX ORDER — ACETAMINOPHEN 325 MG/1
650 TABLET ORAL EVERY 4 HOURS PRN
Status: DISCONTINUED | OUTPATIENT
Start: 2022-01-05 | End: 2022-01-08 | Stop reason: HOSPADM

## 2022-01-05 RX ORDER — PHENYLEPHRINE HCL IN 0.9% NACL 1 MG/10 ML
SYRINGE (ML) INTRAVENOUS PRN
Status: DISCONTINUED | OUTPATIENT
Start: 2022-01-05 | End: 2022-01-05 | Stop reason: SDUPTHER

## 2022-01-05 RX ORDER — FERROUS SULFATE 325(65) MG
325 TABLET ORAL 2 TIMES DAILY WITH MEALS
Status: DISCONTINUED | OUTPATIENT
Start: 2022-01-06 | End: 2022-01-08 | Stop reason: HOSPADM

## 2022-01-05 RX ORDER — SODIUM CHLORIDE 0.9 % (FLUSH) 0.9 %
5-40 SYRINGE (ML) INJECTION EVERY 12 HOURS SCHEDULED
Status: DISCONTINUED | OUTPATIENT
Start: 2022-01-05 | End: 2022-01-08 | Stop reason: HOSPADM

## 2022-01-05 RX ORDER — NALBUPHINE HCL 10 MG/ML
5 AMPUL (ML) INJECTION EVERY 4 HOURS PRN
Status: DISCONTINUED | OUTPATIENT
Start: 2022-01-05 | End: 2022-01-08 | Stop reason: HOSPADM

## 2022-01-05 RX ORDER — PRENATAL WITH FERROUS FUM AND FOLIC ACID 3080; 920; 120; 400; 22; 1.84; 3; 20; 10; 1; 12; 200; 27; 25; 2 [IU]/1; [IU]/1; MG/1; [IU]/1; MG/1; MG/1; MG/1; MG/1; MG/1; MG/1; UG/1; MG/1; MG/1; MG/1; MG/1
1 TABLET ORAL DAILY
Status: DISCONTINUED | OUTPATIENT
Start: 2022-01-06 | End: 2022-01-08 | Stop reason: HOSPADM

## 2022-01-05 RX ORDER — MORPHINE SULFATE 1 MG/ML
INJECTION, SOLUTION EPIDURAL; INTRATHECAL; INTRAVENOUS PRN
Status: DISCONTINUED | OUTPATIENT
Start: 2022-01-05 | End: 2022-01-05 | Stop reason: SDUPTHER

## 2022-01-05 RX ORDER — OXYCODONE HYDROCHLORIDE 5 MG/1
5 TABLET ORAL EVERY 4 HOURS PRN
Status: DISCONTINUED | OUTPATIENT
Start: 2022-01-05 | End: 2022-01-08 | Stop reason: HOSPADM

## 2022-01-05 RX ORDER — SODIUM CHLORIDE 9 MG/ML
25 INJECTION, SOLUTION INTRAVENOUS PRN
Status: DISCONTINUED | OUTPATIENT
Start: 2022-01-05 | End: 2022-01-08 | Stop reason: HOSPADM

## 2022-01-05 RX ORDER — ACETAMINOPHEN 325 MG/1
650 TABLET ORAL EVERY 4 HOURS
Status: DISPENSED | OUTPATIENT
Start: 2022-01-05 | End: 2022-01-06

## 2022-01-05 RX ORDER — BISACODYL 10 MG
10 SUPPOSITORY, RECTAL RECTAL DAILY PRN
Status: DISCONTINUED | OUTPATIENT
Start: 2022-01-05 | End: 2022-01-08 | Stop reason: HOSPADM

## 2022-01-05 RX ORDER — SODIUM CHLORIDE, SODIUM LACTATE, POTASSIUM CHLORIDE, AND CALCIUM CHLORIDE .6; .31; .03; .02 G/100ML; G/100ML; G/100ML; G/100ML
1000 INJECTION, SOLUTION INTRAVENOUS ONCE
Status: DISCONTINUED | OUTPATIENT
Start: 2022-01-05 | End: 2022-01-05

## 2022-01-05 RX ORDER — ONDANSETRON 4 MG/1
8 TABLET, ORALLY DISINTEGRATING ORAL EVERY 8 HOURS PRN
Status: DISCONTINUED | OUTPATIENT
Start: 2022-01-05 | End: 2022-01-08 | Stop reason: HOSPADM

## 2022-01-05 RX ORDER — MODIFIED LANOLIN
OINTMENT (GRAM) TOPICAL
Status: DISCONTINUED | OUTPATIENT
Start: 2022-01-05 | End: 2022-01-08 | Stop reason: HOSPADM

## 2022-01-05 RX ORDER — DIPHENHYDRAMINE HYDROCHLORIDE 50 MG/ML
12.5 INJECTION INTRAMUSCULAR; INTRAVENOUS EVERY 6 HOURS PRN
Status: DISCONTINUED | OUTPATIENT
Start: 2022-01-05 | End: 2022-01-08 | Stop reason: HOSPADM

## 2022-01-05 RX ORDER — KETOROLAC TROMETHAMINE 30 MG/ML
INJECTION, SOLUTION INTRAMUSCULAR; INTRAVENOUS
Status: COMPLETED
Start: 2022-01-05 | End: 2022-01-05

## 2022-01-05 RX ORDER — OXYCODONE HYDROCHLORIDE AND ACETAMINOPHEN 5; 325 MG/1; MG/1
1 TABLET ORAL EVERY 4 HOURS PRN
Status: DISCONTINUED | OUTPATIENT
Start: 2022-01-05 | End: 2022-01-08 | Stop reason: HOSPADM

## 2022-01-05 RX ORDER — TRISODIUM CITRATE DIHYDRATE AND CITRIC ACID MONOHYDRATE 500; 334 MG/5ML; MG/5ML
30 SOLUTION ORAL ONCE
Status: DISCONTINUED | OUTPATIENT
Start: 2022-01-05 | End: 2022-01-05 | Stop reason: ALTCHOICE

## 2022-01-05 RX ORDER — ONDANSETRON 2 MG/ML
INJECTION INTRAMUSCULAR; INTRAVENOUS PRN
Status: DISCONTINUED | OUTPATIENT
Start: 2022-01-05 | End: 2022-01-05 | Stop reason: SDUPTHER

## 2022-01-05 RX ORDER — KETOROLAC TROMETHAMINE 30 MG/ML
30 INJECTION, SOLUTION INTRAMUSCULAR; INTRAVENOUS EVERY 6 HOURS PRN
Status: DISPENSED | OUTPATIENT
Start: 2022-01-05 | End: 2022-01-06

## 2022-01-05 RX ORDER — LABETALOL 200 MG/1
200 TABLET, FILM COATED ORAL 3 TIMES DAILY
Status: DISCONTINUED | OUTPATIENT
Start: 2022-01-05 | End: 2022-01-08 | Stop reason: HOSPADM

## 2022-01-05 RX ORDER — SIMETHICONE 80 MG
80 TABLET,CHEWABLE ORAL EVERY 6 HOURS PRN
Status: DISCONTINUED | OUTPATIENT
Start: 2022-01-05 | End: 2022-01-08 | Stop reason: HOSPADM

## 2022-01-05 RX ORDER — LABETALOL 200 MG/1
200 TABLET, FILM COATED ORAL EVERY 8 HOURS SCHEDULED
Status: DISCONTINUED | OUTPATIENT
Start: 2022-01-05 | End: 2022-01-05

## 2022-01-05 RX ADMIN — Medication 2000 MG: at 16:48

## 2022-01-05 RX ADMIN — MORPHINE SULFATE 0.15 MG: 1 INJECTION, SOLUTION EPIDURAL; INTRATHECAL; INTRAVENOUS at 17:08

## 2022-01-05 RX ADMIN — SODIUM CHLORIDE, POTASSIUM CHLORIDE, SODIUM LACTATE AND CALCIUM CHLORIDE: 600; 310; 30; 20 INJECTION, SOLUTION INTRAVENOUS at 16:50

## 2022-01-05 RX ADMIN — KETOROLAC TROMETHAMINE 30 MG: 30 INJECTION, SOLUTION INTRAMUSCULAR at 19:20

## 2022-01-05 RX ADMIN — Medication 100 MCG: at 17:21

## 2022-01-05 RX ADMIN — KETOROLAC TROMETHAMINE 30 MG: 30 INJECTION, SOLUTION INTRAMUSCULAR; INTRAVENOUS at 19:20

## 2022-01-05 RX ADMIN — ACETAMINOPHEN 1000 MG: 500 TABLET ORAL at 16:44

## 2022-01-05 RX ADMIN — Medication 1000 MG: at 16:44

## 2022-01-05 RX ADMIN — LABETALOL HYDROCHLORIDE 200 MG: 200 TABLET, FILM COATED ORAL at 21:48

## 2022-01-05 RX ADMIN — FAMOTIDINE 20 MG: 10 INJECTION, SOLUTION INTRAVENOUS at 16:44

## 2022-01-05 RX ADMIN — SODIUM CHLORIDE, POTASSIUM CHLORIDE, SODIUM LACTATE AND CALCIUM CHLORIDE: 600; 310; 30; 20 INJECTION, SOLUTION INTRAVENOUS at 11:30

## 2022-01-05 RX ADMIN — METOCLOPRAMIDE 10 MG: 5 INJECTION, SOLUTION INTRAMUSCULAR; INTRAVENOUS at 16:44

## 2022-01-05 RX ADMIN — LABETALOL HCL 200 MG: 200 TABLET, FILM COATED ORAL at 13:53

## 2022-01-05 RX ADMIN — Medication 100 MCG: at 17:18

## 2022-01-05 RX ADMIN — Medication 100 MCG: at 17:10

## 2022-01-05 RX ADMIN — Medication 909 ML/HR: at 17:22

## 2022-01-05 RX ADMIN — Medication 25 MCG: at 12:15

## 2022-01-05 RX ADMIN — BUPIVACAINE HYDROCHLORIDE IN DEXTROSE 1.6 MG: 7.5 INJECTION, SOLUTION SUBARACHNOID at 17:08

## 2022-01-05 RX ADMIN — Medication 100 MCG: at 17:31

## 2022-01-05 RX ADMIN — Medication 100 MCG: at 17:25

## 2022-01-05 RX ADMIN — ONDANSETRON 4 MG: 2 INJECTION INTRAMUSCULAR; INTRAVENOUS at 17:23

## 2022-01-05 RX ADMIN — NALBUPHINE HYDROCHLORIDE 5 MG: 10 INJECTION, SOLUTION INTRAMUSCULAR; INTRAVENOUS; SUBCUTANEOUS at 23:38

## 2022-01-05 RX ADMIN — METOCLOPRAMIDE HYDROCHLORIDE 10 MG: 5 INJECTION INTRAMUSCULAR; INTRAVENOUS at 16:44

## 2022-01-05 ASSESSMENT — PULMONARY FUNCTION TESTS
PIF_VALUE: 0
PIF_VALUE: 1
PIF_VALUE: 0

## 2022-01-05 ASSESSMENT — PAIN - FUNCTIONAL ASSESSMENT: PAIN_FUNCTIONAL_ASSESSMENT: PREVENTS OR INTERFERES SOME ACTIVE ACTIVITIES AND ADLS

## 2022-01-05 ASSESSMENT — PAIN SCALES - GENERAL: PAINLEVEL_OUTOF10: 4

## 2022-01-05 ASSESSMENT — PAIN DESCRIPTION - PAIN TYPE: TYPE: SURGICAL PAIN

## 2022-01-05 ASSESSMENT — PAIN DESCRIPTION - PROGRESSION: CLINICAL_PROGRESSION: GRADUALLY WORSENING

## 2022-01-05 ASSESSMENT — PAIN DESCRIPTION - LOCATION: LOCATION: ABDOMEN

## 2022-01-05 ASSESSMENT — PAIN DESCRIPTION - DESCRIPTORS: DESCRIPTORS: TENDER

## 2022-01-05 ASSESSMENT — PAIN DESCRIPTION - ONSET: ONSET: GRADUAL

## 2022-01-05 ASSESSMENT — PAIN DESCRIPTION - ORIENTATION: ORIENTATION: MID;LOWER

## 2022-01-05 ASSESSMENT — PAIN DESCRIPTION - FREQUENCY: FREQUENCY: CONTINUOUS

## 2022-01-05 ASSESSMENT — LIFESTYLE VARIABLES: SMOKING_STATUS: 0

## 2022-01-05 NOTE — PROGRESS NOTES
Patient presents to unit for scheduled primary LTCS for breech presentation.  at 37.0 weeks. Patient was recommended for delivery by MFM for [facility]. Patient denies LOF, VB & CTX + FM.

## 2022-01-05 NOTE — PROGRESS NOTES
Patient does not want to continue with the IOL, and would like to proceed with a primary LTCS. Dr. Vadim Bhatai notified. Attempt for 1700.

## 2022-01-05 NOTE — ANESTHESIA PRE PROCEDURE
Department of Anesthesiology  Preprocedure Note       Name:  Debora Christiansen   Age:  32 y.o.  :  1990                                          MRN:  [id number]         Date:  2022      Surgeon: Luzmaria Blanchard):  Oliverio Perez DO    Procedure: Procedure(s):   SECTION    Medications prior to admission:   Prior to Admission medications    Medication Sig Start Date End Date Taking? Authorizing Provider   labetalol (NORMODYNE) 200 MG tablet Take 1 tablet by mouth 3 times daily 21   Violet Curling, MD   aspirin 81 MG chewable tablet Take 81 mg by mouth daily    Historical Provider, MD Kowalski-FeAsp-Meth-FA-DHA w/o A (PRENATE DHA) 18-0.6-0.4-300 MG CAPS Take 1 tablet by mouth daily 21   Mecca Oliva APRN - CNP       Current medications:    No current facility-administered medications for this visit. No current outpatient medications on file.      Facility-Administered Medications Ordered in Other Visits   Medication Dose Route Frequency Provider Last Rate Last Admin    lactated ringers infusion   IntraVENous Continuous Oliverio Boehringer,  mL/hr at 22 1130 New Bag at 22 1130    lactated ringers bolus  1,000 mL IntraVENous Once Oliverio Boehringer, DO   Stopped at 22 1131    sodium chloride flush 0.9 % injection 10 mL  10 mL IntraVENous 2 times per day Oliverio Boehringer, DO        sodium chloride flush 0.9 % injection 10 mL  10 mL IntraVENous PRN Oliverio Boehringer, DO        0.9 % sodium chloride infusion  25 mL IntraVENous PRN Oliverio Boehringer, DO        oxytocin (PITOCIN) 30 units in 500 mL infusion  87.3 connor-units/min IntraVENous Continuous PRN Oliverio Boehringer, DO        And    oxytocin (PITOCIN) 10 unit bolus from the bag  10 Units IntraVENous PRN Oliverio Boehringer, DO        oxytocin (PITOCIN) 30 units in 500 mL infusion  87.3 connor-units/min IntraVENous Continuous PRN Oliverio Boehringer, DO        And    oxytocin (PITOCIN) 10 unit bolus from the bag  10 Units IntraVENous PRN Cozeisiah Cabral, DO        ondansetron [facility]) injection 4 mg  4 mg IntraVENous Q6H PRN Cozeisiah Cabral, DO        lactated ringers infusion   IntraVENous Continuous Cozeisiah Cabral DO        butorphanol (STADOL) injection 2 mg  2 mg IntraVENous Q3H PRN Cozeisiah Cabral, DO        miSOPROStol (CYTOTEC) pre-split tablet TABS 25 mcg  25 mcg Vaginal Q4H Cozette Marianna, DO   25 mcg at 22 1215    labetalol (NORMODYNE) tablet 200 mg  200 mg Oral 3 times per day Cozeisiah Cabral, DO   200 mg at 22 1353    ceFAZolin (ANCEF) 2000 mg in sterile water 20 mL IV syringe  2,000 mg IntraVENous Once Vanessa [facility], DO        ceFAZolin 2000 mg in 20 mL SWFI IV Syringe IV syringe                Allergies:  No Known Allergies    Problem List:    Patient Active Problem List   Diagnosis Code    Elevated blood pressure affecting pregnancy in third trimester, antepartum O16.3    37 weeks gestation of pregnancy Z3A.37       Past Medical History:        Diagnosis Date    Hypertension     Gestational hypertension       Past Surgical History:        Procedure Laterality Date    TONSILLECTOMY      WISDOM TOOTH EXTRACTION         Social History:    Social History     Tobacco Use    Smoking status: Former Smoker     Packs/day: 0.50     Quit date: 2021     Years since quittin.2    Smokeless tobacco: Never Used   Substance Use Topics    Alcohol use: No                                Counseling given: Not Answered      Vital Signs (Current): There were no vitals filed for this visit.                                            BP Readings from Last 3 Encounters:   22 (!) 134/93   22 (!) 160/91   21 130/87       NPO Status:                                                                                 BMI:   Wt Readings from Last 3 Encounters:   22 202 lb (91.6 kg)   22 201 lb 6.4 oz (91.4 kg)   21 202 lb (91.6 kg)     There is no height or weight on file to calculate BMI.    CBC:   Lab Results   Component Value Date    WBC 10.1 01/05/2022    RBC 3.13 01/05/2022    HGB 9.8 01/05/2022    HCT 28.5 01/05/2022    MCV 91.1 01/05/2022    RDW 13.2 01/05/2022     01/05/2022       CMP:   Lab Results   Component Value Date     11/29/2021    K 3.1 11/29/2021     11/29/2021    CO2 22 11/29/2021    BUN 5 11/29/2021    CREATININE 0.7 11/29/2021    GFRAA >60 11/29/2021    LABGLOM >60 11/29/2021    GLUCOSE 88 11/29/2021    PROT 6.0 11/29/2021    CALCIUM 9.6 11/29/2021    BILITOT 0.3 11/29/2021    ALKPHOS 122 11/29/2021    AST 23 11/29/2021    ALT 46 11/29/2021       POC Tests: No results for input(s): POCGLU, POCNA, POCK, POCCL, POCBUN, POCHEMO, POCHCT in the last 72 hours. Coags:   Lab Results   Component Value Date    PROTIME 9.2 11/24/2021    INR 0.8 11/24/2021    APTT 25.3 11/24/2021       HCG (If Applicable):   Lab Results   Component Value Date    PREGTESTUR positive 06/22/2021        ABGs: No results found for: PHART, PO2ART, OGN6GJA, HZO3QEK, BEART, F7FPNTKS     Type & Screen (If Applicable):  No results found for: LABABO, LABRH    Drug/Infectious Status (If Applicable):  No results found for: HIV, HEPCAB    COVID-19 Screening (If Applicable):   Lab Results   Component Value Date    COVID19 Not Detected 11/23/2021           Anesthesia Evaluation  Patient summary reviewed and Nursing notes reviewed no history of anesthetic complications:   Airway: Mallampati: III  TM distance: >3 FB   Neck ROM: full  Mouth opening: > = 3 FB Dental: normal exam     Comment: Fake teeth    Pulmonary:Negative Pulmonary ROS and normal exam  breath sounds clear to auscultation      (-) not a current smoker ( quit 9/29/21)          Patient did not smoke on day of surgery.                  Cardiovascular:Negative CV ROS  Exercise tolerance: good (>4 METS),   (+) hypertension ( gestational, on labetolol ): moderate,         Rhythm: regular  Rate: normal           Beta Blocker:  Dose within 24 Hrs         Neuro/Psych:   Negative Neuro/Psych ROS              GI/Hepatic/Renal:   (+) GERD ( gestational ): well controlled,           Endo/Other: Negative Endo/Other ROS   (+) blood dyscrasia (Hbg 9.8 & Hct 28.5): anemia:., .          Pt had no PAT visit        ROS comment: Took aspirin 81 mg 1/5/21 at 0900 Abdominal:             Vascular: negative vascular ROS. Other Findings: Parturient abdomen            Anesthesia Plan      spinal     ASA 2     (UDS + for Fentanyl)  Induction: intravenous. MIPS: Postoperative opioids intended and Prophylactic antiemetics administered. Anesthetic plan and risks discussed with patient. Use of blood products discussed with patient whom consented to blood products. Plan discussed with CRNA and attending. ANU Lay - CRNA   1/5/2022      DOS STAFF ADDENDUM:    Patient seen and chart reviewed on DOS. No interval change in history or exam.   I agree with the anesthesia pre-operative assessment written above and have made the appropriate addendums and/or changes. Considered full stomach.      Jett Galvan DO  January 5, 2022  5:14 PM

## 2022-01-05 NOTE — PROGRESS NOTES
House OB Progress Note    Asked to perform limited sonogram to confirm presentation. Sonogram now reveals cephalic presentation.     Cx cl-ft/75-80%/-2  Plan:  Dr. Matthew notified

## 2022-01-05 NOTE — L&D DELIVERY NOTE
PREOPERATIVE DIAGNOSES:     1. 37w0d intrauterine pregnancy. 2.  Chronic hypertension  3. Super imposed preeclampsia  4. Maternal request      POSTOPERATIVE DIAGNOSES:     Same.      PROCEDURE: primary low transverse  section        SURGEON: Oliverio Perez DO       ASSISTANT: Piedad Davila      ESTIMATED BLOOD LOSS:  742IF        COMPLICATIONS:  None.         ANESTHESIA:   Spinal anesthesia        FINDINGS:  Nhi Tian Born  Sex:  Male  Fetal Position:  Cephalic,   Apgars:  1 minute: 8; 5 minute: 9  Weight:  6 pounds, 15 ounces  Tubes, uterus, ovaries:  normal          DETAILS OF PROCEDURE:    The patient was taken to the operating room where Spinal anesthesia was administered and found to be adequate. Abdomen was prepped   and draped in the normal sterile fashion. Sanchez catheter had previously been   placed. Pfannenstiel skin incision made with a scalpel, carried down to the fascia with cautery. The fascia was nicked in the midline and extended laterally with   cautery. Muscles were  in the midline. Peritoneum was grasped with   hemostats, tented up, and entered sharply. Peritoneal incision was extended   superiorly and inferiorly with good visualization of bladder. Delee bladder blade was introduced. Bladder flap was created with sharp dissection. Low transverse uterine incision was made with a scalpel and extended bluntly. Membranes ruptured for Clear fluid. A viable male infant was delivered in the cephalic presentation without diffiuclty. Baby was bulb suctioned on the abdomen. Cord was clamped and cut, and handed to waiting R.N. Cord blood was obtained. Placenta was manually extracted with 3 vessel cord. Uterus was exteriorized, cleared of all clot and debris. Uterine incision   was closed with vicryl in a running locked fashion. Good hemostasis was   noted. Uterus, tubes, and ovaries appeared normal. Uterus was returned to   the pelvis.  The pelvis was irrigated, cleared of all clot and debris. Fascia was closed with 0 PDS  in a running fashion. Subcutaneous tissue was irrigated, made hemostatic. Skin was closed with absorbable subcutaneous staples. Baby and mom to recovery room in stable condition. Placenta to pathology: Yes.     Eben Velasquez, DO

## 2022-01-05 NOTE — ANESTHESIA PROCEDURE NOTES
Spinal Block    Patient location during procedure: OR  Start time: 1/5/2022 5:03 PM  End time: 1/5/2022 5:08 PM  Reason for block: primary anesthetic and at surgeon's request  Staffing  Performed: other anesthesia staff   Anesthesiologist: Jett Galvan DO  Resident/CRNA: ANU Kasper CRNA  Other anesthesia staff: Luis Alberto Amaro RN  Preanesthetic Checklist  Completed: patient identified, IV checked, site marked, risks and benefits discussed, surgical consent, monitors and equipment checked, pre-op evaluation, timeout performed, anesthesia consent given, oxygen available and patient being monitored  Spinal Block  Patient position: sitting  Prep: Betadine  Patient monitoring: cardiac monitor, continuous pulse ox and frequent blood pressure checks  Approach: midline  Location: L3/L4  Provider prep: mask and sterile gloves  Local infiltration: lidocaine  Agent: bupivacaine  Adjuvant: duramorph  Dose: 1.6  Dose: 1.6  Needle  Needle type: Pencan   Needle gauge: 24 G  Needle length: 3.5 in  Assessment  Sensory level: T4  Swirl obtained: Yes  CSF: clear  Attempts: 1  Hemodynamics: stable

## 2022-01-05 NOTE — PROGRESS NOTES
Patient delivered normal  boy via elective primary LTCS. APGARS 8/9. Dr. Ave Harry for Dr. Jennett Landau assisted by Dr. Coretta Hair present for delivery. Beatrice taken to warmer, dried, stimulated and bulb suctioned.   Patient and  VSS

## 2022-01-05 NOTE — PROGRESS NOTES
Patient scanned by Dr. Donnie Chandler. Baby is now vertex. Dr. Treasure Piña updated and orders for induction received. Patient to be transferred to L&D.

## 2022-01-06 LAB
HCT VFR BLD CALC: 22 % (ref 34–48)
HEMOGLOBIN: 7.3 G/DL (ref 11.5–15.5)

## 2022-01-06 PROCEDURE — 1220000000 HC SEMI PRIVATE OB R&B

## 2022-01-06 PROCEDURE — 85018 HEMOGLOBIN: CPT

## 2022-01-06 PROCEDURE — 6370000000 HC RX 637 (ALT 250 FOR IP): Performed by: OBSTETRICS & GYNECOLOGY

## 2022-01-06 PROCEDURE — 6370000000 HC RX 637 (ALT 250 FOR IP): Performed by: ANESTHESIOLOGY

## 2022-01-06 PROCEDURE — 2580000003 HC RX 258: Performed by: OBSTETRICS & GYNECOLOGY

## 2022-01-06 PROCEDURE — 36415 COLL VENOUS BLD VENIPUNCTURE: CPT

## 2022-01-06 PROCEDURE — 6360000002 HC RX W HCPCS: Performed by: ANESTHESIOLOGY

## 2022-01-06 PROCEDURE — 85014 HEMATOCRIT: CPT

## 2022-01-06 RX ADMIN — METFORMIN HYDROCHLORIDE 1 TABLET: 500 TABLET, EXTENDED RELEASE ORAL at 09:10

## 2022-01-06 RX ADMIN — FERROUS SULFATE TAB 325 MG (65 MG ELEMENTAL FE) 325 MG: 325 (65 FE) TAB at 09:10

## 2022-01-06 RX ADMIN — ACETAMINOPHEN 650 MG: 325 TABLET ORAL at 15:43

## 2022-01-06 RX ADMIN — DOCUSATE SODIUM 100 MG: 100 CAPSULE ORAL at 09:09

## 2022-01-06 RX ADMIN — OXYCODONE HYDROCHLORIDE 5 MG: 5 TABLET ORAL at 09:09

## 2022-01-06 RX ADMIN — SIMETHICONE 80 MG: 80 TABLET, CHEWABLE ORAL at 15:43

## 2022-01-06 RX ADMIN — DOCUSATE SODIUM 100 MG: 100 CAPSULE ORAL at 20:24

## 2022-01-06 RX ADMIN — IBUPROFEN 800 MG: 800 TABLET, FILM COATED ORAL at 18:46

## 2022-01-06 RX ADMIN — KETOROLAC TROMETHAMINE 30 MG: 30 INJECTION, SOLUTION INTRAMUSCULAR at 01:48

## 2022-01-06 RX ADMIN — OXYCODONE HYDROCHLORIDE 5 MG: 5 TABLET ORAL at 15:43

## 2022-01-06 RX ADMIN — FERROUS SULFATE TAB 325 MG (65 MG ELEMENTAL FE) 325 MG: 325 (65 FE) TAB at 17:27

## 2022-01-06 RX ADMIN — OXYCODONE HYDROCHLORIDE AND ACETAMINOPHEN 1 TABLET: 5; 325 TABLET ORAL at 22:10

## 2022-01-06 RX ADMIN — SODIUM CHLORIDE, PRESERVATIVE FREE 10 ML: 5 INJECTION INTRAVENOUS at 20:24

## 2022-01-06 RX ADMIN — SIMETHICONE 80 MG: 80 TABLET, CHEWABLE ORAL at 09:09

## 2022-01-06 RX ADMIN — SODIUM CHLORIDE, POTASSIUM CHLORIDE, SODIUM LACTATE AND CALCIUM CHLORIDE: 600; 310; 30; 20 INJECTION, SOLUTION INTRAVENOUS at 03:23

## 2022-01-06 RX ADMIN — ACETAMINOPHEN 650 MG: 325 TABLET ORAL at 09:10

## 2022-01-06 ASSESSMENT — PAIN SCALES - GENERAL
PAINLEVEL_OUTOF10: 5
PAINLEVEL_OUTOF10: 4
PAINLEVEL_OUTOF10: 6
PAINLEVEL_OUTOF10: 8
PAINLEVEL_OUTOF10: 8

## 2022-01-06 NOTE — CARE COORDINATION
SW Discharge Planning   KARTIK received consult for \" Positive MJ at begining of pregnancy\" ( Mother with positive UDS for [facility] on 6/22/21)     KARTIK met privately with Samara [place] ( [phone]) first time mother to baby boy Meka Wells ( 1/5/22) and introduced self and role. Dione Clement reported that she resides at the address listed in the chart by herself, however baby's father Vince Johnsons the 3rd and his family will be be involved and are supportive. Dione Clement reported that she is employed at [employer] in [place], and will be adding baby to her PA Renato Walter ( private insurance) . Dione Clement reported that she is currently researching pediatricians, and plans on having one picked out before discharge. Per Dione Clement, prenatal care was with Dr. Lincoln Gregory. Jessi Reported that she has all needed items including a car seat and pack and play. We discussed safe sleep practices. Dione Clement was agreeable to a [facility] and [place] referral. Dione Clement  denied any past or current history of children services involvement, legal issues, substance abuse aside from [facility], domestic violence or mental health diagnosis. We discussed awareness of Post Partum Depression and encouraged contact with her OB if any problems arise. KARTIK did address THC usage, and she admitted usage prior to knowing of her pregnancy.  Jessi expressed understanding for the need of a [employer] ( [phone]) referral. (  Jessi's UDS on 1/5 did report being positive for fentanyl, however she was prescribed labetalol which causes a false positive)    KARTIK completed [employer] ( [phone]) referral to [address] [place] [address] in intake     PLAN    Baby can NOT be discharged home until [facility] ( [phone]) provides disposition  SW to continue communication with nursing staff and [facility] ( [phone])    [facility] and [address] referrals were completed     Electronically signed by ASIYA Trujillo on 1/6/2022 at 12:53 PM

## 2022-01-06 NOTE — FLOWSHEET NOTE
Assisted to BR with steady. Patient did not feel like she needed it and ambulated back to bed on her own. Voided normally. Rsa. Denies light headed or dizziness. Instructed patient to call for assistance one more time to br.

## 2022-01-06 NOTE — FLOWSHEET NOTE
Patient up out of bed, ambulated to restroom and became lightheaded and dizzy. Yuliet care performed. Vitals as charted. patient assisted back to bed with 2 nurses w/o difficulty. Patient states she's feeling better and is sitting up in bed drinking water.

## 2022-01-06 NOTE — PROGRESS NOTES
[place] Hearing screening results were discussed with parent. Questions answered. Brochure given to parent. Advised to monitor developmental milestones and contact physician for any concerns.    Moreno Eason

## 2022-01-06 NOTE — FLOWSHEET NOTE
Dr. David Leonard notified of hemoglobin 7.3 this morning (down from 9.8), patient symptomatic. Up voiding and moving around without dizziness or light headeness.   To repeat h and h in AM.

## 2022-01-06 NOTE — FLOWSHEET NOTE
Admitted to room 316. Discussed doctors orders, admission handouts, and oriented patient to the room and call light. Reviewed  falls and safe sleep. Fundus firm @ U ,  small amount of lochia, rubra- no clots. IV fluids infusing at correct rates. PCD's on and functioning. Denies any headaches, blurred vision, epigastric pain,  calf pain or tenderness. Patient does not want the TDap or flu vaccine prior to discharge. PPD scale provided and instructions given. Instructed to call for any needs.

## 2022-01-06 NOTE — PROGRESS NOTES
Dr. Alfaro Range updated on last 3 blood pressures that are elevated. Patient is to continue labetalol 200 mg. Next dose at 2200.   Ok to transfer to [facility]

## 2022-01-06 NOTE — ANESTHESIA POSTPROCEDURE EVALUATION
Department of Anesthesiology  Postprocedure Note    Patient: Laura Lebron  MRN: [id number]  YOB: 1990  Date of evaluation: 2022  Time:  9:23 AM     Procedure Summary     Date: 22 Room / Location: [facility] OR  [facility]    Anesthesia Start:  Anesthesia Stop:     Procedure:  SECTION (N/A Uterus) Diagnosis: (csection)    Surgeons: Rajesh Baker DO Responsible Provider: Ajay Sweet DO    Anesthesia Type: spinal ASA Status: 2          Anesthesia Type: spinal    Suresh Phase I: Suresh Score: 10    Suresh Phase II:      Last vitals: Reviewed and per EMR flowsheets.        Anesthesia Post Evaluation    Patient location during evaluation: floor  Patient participation: complete - patient participated  Level of consciousness: awake and alert  Airway patency: patent  Nausea & Vomiting: no nausea and no vomiting  Complications: no  Cardiovascular status: hemodynamically stable  Respiratory status: acceptable  Hydration status: stable

## 2022-01-07 ENCOUNTER — APPOINTMENT (OUTPATIENT)
Dept: CT IMAGING | Age: 32
End: 2022-01-07
Payer: COMMERCIAL

## 2022-01-07 LAB
BLOOD BANK DISPENSE STATUS: NORMAL
BLOOD BANK DISPENSE STATUS: NORMAL
BLOOD BANK PRODUCT CODE: NORMAL
BLOOD BANK PRODUCT CODE: NORMAL
BPU ID: NORMAL
BPU ID: NORMAL
COMMENT: NORMAL
DESCRIPTION BLOOD BANK: NORMAL
DESCRIPTION BLOOD BANK: NORMAL
FENTANYL, URN, QUANT: <5
HCT VFR BLD CALC: 15 % (ref 34–48)
HCT VFR BLD CALC: 16 % (ref 34–48)
HCT VFR BLD CALC: 23.5 % (ref 34–48)
HEMOGLOBIN: 4.9 G/DL (ref 11.5–15.5)
HEMOGLOBIN: 5.4 G/DL (ref 11.5–15.5)
HEMOGLOBIN: 7.8 G/DL (ref 11.5–15.5)
NORFENTANYL, URN, QUANT: <10
SARS-COV-2, NAAT: NOT DETECTED

## 2022-01-07 PROCEDURE — 1220000000 HC SEMI PRIVATE OB R&B

## 2022-01-07 PROCEDURE — 2580000003 HC RX 258: Performed by: OBSTETRICS & GYNECOLOGY

## 2022-01-07 PROCEDURE — 85018 HEMOGLOBIN: CPT

## 2022-01-07 PROCEDURE — 85014 HEMATOCRIT: CPT

## 2022-01-07 PROCEDURE — 6360000004 HC RX CONTRAST MEDICATION: Performed by: RADIOLOGY

## 2022-01-07 PROCEDURE — 6370000000 HC RX 637 (ALT 250 FOR IP): Performed by: OBSTETRICS & GYNECOLOGY

## 2022-01-07 PROCEDURE — 36430 TRANSFUSION BLD/BLD COMPNT: CPT

## 2022-01-07 PROCEDURE — 74177 CT ABD & PELVIS W/CONTRAST: CPT

## 2022-01-07 PROCEDURE — 36415 COLL VENOUS BLD VENIPUNCTURE: CPT

## 2022-01-07 PROCEDURE — 87635 SARS-COV-2 COVID-19 AMP PRB: CPT

## 2022-01-07 RX ORDER — SODIUM CHLORIDE 0.9 % (FLUSH) 0.9 %
5-40 SYRINGE (ML) INJECTION EVERY 12 HOURS
Status: DISCONTINUED | OUTPATIENT
Start: 2022-01-07 | End: 2022-01-08 | Stop reason: HOSPADM

## 2022-01-07 RX ORDER — SODIUM CHLORIDE 9 MG/ML
INJECTION, SOLUTION INTRAVENOUS PRN
Status: DISCONTINUED | OUTPATIENT
Start: 2022-01-07 | End: 2022-01-08 | Stop reason: HOSPADM

## 2022-01-07 RX ADMIN — LABETALOL HYDROCHLORIDE 200 MG: 200 TABLET, FILM COATED ORAL at 16:52

## 2022-01-07 RX ADMIN — LABETALOL HYDROCHLORIDE 200 MG: 200 TABLET, FILM COATED ORAL at 08:26

## 2022-01-07 RX ADMIN — OXYCODONE HYDROCHLORIDE AND ACETAMINOPHEN 1 TABLET: 5; 325 TABLET ORAL at 18:39

## 2022-01-07 RX ADMIN — OXYCODONE HYDROCHLORIDE AND ACETAMINOPHEN 1 TABLET: 5; 325 TABLET ORAL at 06:54

## 2022-01-07 RX ADMIN — LABETALOL HYDROCHLORIDE 200 MG: 200 TABLET, FILM COATED ORAL at 23:25

## 2022-01-07 RX ADMIN — OXYCODONE HYDROCHLORIDE AND ACETAMINOPHEN 1 TABLET: 5; 325 TABLET ORAL at 13:21

## 2022-01-07 RX ADMIN — DOCUSATE SODIUM 100 MG: 100 CAPSULE ORAL at 08:25

## 2022-01-07 RX ADMIN — FERROUS SULFATE TAB 325 MG (65 MG ELEMENTAL FE) 325 MG: 325 (65 FE) TAB at 18:39

## 2022-01-07 RX ADMIN — IBUPROFEN 800 MG: 800 TABLET, FILM COATED ORAL at 08:55

## 2022-01-07 RX ADMIN — METFORMIN HYDROCHLORIDE 1 TABLET: 500 TABLET, EXTENDED RELEASE ORAL at 08:25

## 2022-01-07 RX ADMIN — SODIUM CHLORIDE, PRESERVATIVE FREE 10 ML: 5 INJECTION INTRAVENOUS at 20:36

## 2022-01-07 RX ADMIN — SIMETHICONE 80 MG: 80 TABLET, CHEWABLE ORAL at 08:26

## 2022-01-07 RX ADMIN — FERROUS SULFATE TAB 325 MG (65 MG ELEMENTAL FE) 325 MG: 325 (65 FE) TAB at 08:25

## 2022-01-07 RX ADMIN — IBUPROFEN 800 MG: 800 TABLET, FILM COATED ORAL at 23:25

## 2022-01-07 RX ADMIN — IOPAMIDOL 75 ML: 755 INJECTION, SOLUTION INTRAVENOUS at 16:15

## 2022-01-07 RX ADMIN — DOCUSATE SODIUM 100 MG: 100 CAPSULE ORAL at 20:36

## 2022-01-07 ASSESSMENT — PAIN - FUNCTIONAL ASSESSMENT: PAIN_FUNCTIONAL_ASSESSMENT: ACTIVITIES ARE NOT PREVENTED

## 2022-01-07 ASSESSMENT — PAIN SCALES - GENERAL
PAINLEVEL_OUTOF10: 6
PAINLEVEL_OUTOF10: 6
PAINLEVEL_OUTOF10: 4
PAINLEVEL_OUTOF10: 4
PAINLEVEL_OUTOF10: 6

## 2022-01-07 ASSESSMENT — PAIN DESCRIPTION - LOCATION: LOCATION: ABDOMEN;INCISION

## 2022-01-07 ASSESSMENT — PAIN DESCRIPTION - ONSET: ONSET: GRADUAL

## 2022-01-07 ASSESSMENT — PAIN DESCRIPTION - FREQUENCY: FREQUENCY: INTERMITTENT

## 2022-01-07 ASSESSMENT — PAIN DESCRIPTION - ORIENTATION: ORIENTATION: LOWER

## 2022-01-07 ASSESSMENT — PAIN DESCRIPTION - DESCRIPTORS: DESCRIPTORS: ACHING;DISCOMFORT;SORE

## 2022-01-07 ASSESSMENT — PAIN DESCRIPTION - PAIN TYPE: TYPE: ACUTE PAIN;SURGICAL PAIN

## 2022-01-07 ASSESSMENT — PAIN DESCRIPTION - PROGRESSION: CLINICAL_PROGRESSION: GRADUALLY WORSENING

## 2022-01-07 NOTE — PROGRESS NOTES
Progress Note    SUBJECTIVE:   Pt comfortable; +void; +flatus; +ambulation    OBJECTIVE:    VITALS:  /84   Pulse 110   Temp 98.2 °F (36.8 °C) (Oral)   Resp 16   Ht 5' 7\" (1.702 m)   Wt 202 lb (91.6 kg)   LMP 04/15/2021   SpO2 99%   Breastfeeding Unknown   BMI 31.64 kg/m²   Physical Exam  ABDOMEN:  normal bowel sounds, non-distended, non-tender and incision d/i  Ext nt    DATA:  Hemoglobin/Hematocrit:    Lab Results   Component Value Date    HGB 7.3 01/06/2022    HCT 22.0 01/06/2022       ASSESSMENT AND PLAN:  S/p ltcs  Active Problems:    37 weeks gestation of pregnancy  Resolved Problems:    * No resolved hospital problems.  *    POD#1  Plan discharge home in 1-2 days    Electronically signed by Jonatan Cash DO on 1/6/2022 at 9:22 PM

## 2022-01-07 NOTE — PROGRESS NOTES
Progress Note    SUBJECTIVE:   Pt comfortable; ***void; ***flatus; ***ambulation    OBJECTIVE:    VITALS:  /71   Pulse 95   Temp 98.1 °F (36.7 °C) (Oral)   Resp 16   Ht 5' 7\" (1.702 m)   Wt 202 lb (91.6 kg)   LMP 04/15/2021   SpO2 99%   Breastfeeding Unknown   BMI 31.64 kg/m²   Physical Exam  ABDOMEN:  {BOWEL SOUNDS:056694184}, {DISTENTION:987227639}, {TENDERNESS:182854387} and {PALPATION:857741707}  ***    DATA:  Hemoglobin/Hematocrit:    Lab Results   Component Value Date    HGB 5.4 01/07/2022    HCT 16.0 01/07/2022       ASSESSMENT AND PLAN:  S/p ***  Active Problems:    37 weeks gestation of pregnancy  Resolved Problems:    * No resolved hospital problems.  *    POD#***  Plan discharge home ***    Electronically signed by Ngozi Ayoub DO on 1/7/2022 at 2:47 PM

## 2022-01-07 NOTE — CARE COORDINATION
SW Discharge Planning     SW called [facility] ( [phone]) and spoke with , Dalila Doss, who reported that [facility] ( [phone]) will NOT be involved at this time     PLAN    Baby CAN be discharged home when medically ready, children services will NOT be involved at this time.      Electronically signed by ASIYA Escalante on 1/7/2022 at 10:24 AM

## 2022-01-08 VITALS
OXYGEN SATURATION: 98 % | BODY MASS INDEX: 31.71 KG/M2 | DIASTOLIC BLOOD PRESSURE: 85 MMHG | SYSTOLIC BLOOD PRESSURE: 144 MMHG | WEIGHT: 202 LBS | TEMPERATURE: 97.9 F | HEIGHT: 67 IN | RESPIRATION RATE: 18 BRPM | HEART RATE: 82 BPM

## 2022-01-08 PROBLEM — G89.18 ACUTE POSTOPERATIVE PAIN: Status: ACTIVE | Noted: 2022-01-08

## 2022-01-08 PROBLEM — D62 ACUTE BLOOD LOSS ANEMIA: Status: ACTIVE | Noted: 2022-01-08

## 2022-01-08 LAB
HCT VFR BLD CALC: 23.5 % (ref 34–48)
HEMOGLOBIN: 7.7 G/DL (ref 11.5–15.5)

## 2022-01-08 PROCEDURE — 6370000000 HC RX 637 (ALT 250 FOR IP): Performed by: OBSTETRICS & GYNECOLOGY

## 2022-01-08 PROCEDURE — 85014 HEMATOCRIT: CPT

## 2022-01-08 PROCEDURE — 36415 COLL VENOUS BLD VENIPUNCTURE: CPT

## 2022-01-08 PROCEDURE — 2580000003 HC RX 258: Performed by: OBSTETRICS & GYNECOLOGY

## 2022-01-08 PROCEDURE — 85018 HEMOGLOBIN: CPT

## 2022-01-08 RX ORDER — IBUPROFEN 800 MG/1
800 TABLET ORAL EVERY 8 HOURS
Qty: 120 TABLET | Refills: 3 | Status: SHIPPED | OUTPATIENT
Start: 2022-01-08

## 2022-01-08 RX ORDER — OXYCODONE HYDROCHLORIDE AND ACETAMINOPHEN 5; 325 MG/1; MG/1
2 TABLET ORAL EVERY 4 HOURS PRN
Qty: 20 TABLET | Refills: 0 | Status: SHIPPED | OUTPATIENT
Start: 2022-01-08 | End: 2022-01-13

## 2022-01-08 RX ORDER — FERROUS SULFATE 325(65) MG
325 TABLET ORAL 2 TIMES DAILY WITH MEALS
Qty: 30 TABLET | Refills: 3 | Status: SHIPPED | OUTPATIENT
Start: 2022-01-08

## 2022-01-08 RX ADMIN — METFORMIN HYDROCHLORIDE 1 TABLET: 500 TABLET, EXTENDED RELEASE ORAL at 08:42

## 2022-01-08 RX ADMIN — SODIUM CHLORIDE, PRESERVATIVE FREE 10 ML: 5 INJECTION INTRAVENOUS at 08:45

## 2022-01-08 RX ADMIN — OXYCODONE HYDROCHLORIDE AND ACETAMINOPHEN 1 TABLET: 5; 325 TABLET ORAL at 08:42

## 2022-01-08 RX ADMIN — DOCUSATE SODIUM 100 MG: 100 CAPSULE ORAL at 08:42

## 2022-01-08 RX ADMIN — IBUPROFEN 800 MG: 800 TABLET, FILM COATED ORAL at 08:43

## 2022-01-08 RX ADMIN — FERROUS SULFATE TAB 325 MG (65 MG ELEMENTAL FE) 325 MG: 325 (65 FE) TAB at 08:42

## 2022-01-08 RX ADMIN — SIMETHICONE 80 MG: 80 TABLET, CHEWABLE ORAL at 08:42

## 2022-01-08 RX ADMIN — OXYCODONE HYDROCHLORIDE AND ACETAMINOPHEN 1 TABLET: 5; 325 TABLET ORAL at 04:43

## 2022-01-08 RX ADMIN — LABETALOL HYDROCHLORIDE 200 MG: 200 TABLET, FILM COATED ORAL at 08:44

## 2022-01-08 RX ADMIN — OXYCODONE HYDROCHLORIDE AND ACETAMINOPHEN 1 TABLET: 5; 325 TABLET ORAL at 13:23

## 2022-01-08 ASSESSMENT — PAIN DESCRIPTION - PROGRESSION
CLINICAL_PROGRESSION: GRADUALLY WORSENING
CLINICAL_PROGRESSION: RESOLVED

## 2022-01-08 ASSESSMENT — PAIN SCALES - GENERAL
PAINLEVEL_OUTOF10: 5
PAINLEVEL_OUTOF10: 4
PAINLEVEL_OUTOF10: 0
PAINLEVEL_OUTOF10: 4

## 2022-01-08 ASSESSMENT — PAIN DESCRIPTION - LOCATION: LOCATION: ABDOMEN;INCISION

## 2022-01-08 ASSESSMENT — PAIN - FUNCTIONAL ASSESSMENT: PAIN_FUNCTIONAL_ASSESSMENT: ACTIVITIES ARE NOT PREVENTED

## 2022-01-08 ASSESSMENT — PAIN DESCRIPTION - PAIN TYPE: TYPE: ACUTE PAIN;SURGICAL PAIN

## 2022-01-08 ASSESSMENT — PAIN DESCRIPTION - DESCRIPTORS: DESCRIPTORS: DISCOMFORT;SORE

## 2022-01-08 ASSESSMENT — PAIN DESCRIPTION - FREQUENCY: FREQUENCY: INTERMITTENT

## 2022-01-08 ASSESSMENT — PAIN DESCRIPTION - ORIENTATION: ORIENTATION: LOWER

## 2022-01-08 ASSESSMENT — PAIN DESCRIPTION - ONSET: ONSET: GRADUAL

## 2022-01-08 NOTE — PLAN OF CARE
Problem: Venous Thromboembolism - Risk of:  Goal: Will show no signs or symptoms of venous thromboembolism  Description: Will show no signs or symptoms of venous thromboembolism  Outcome: Met This Shift     Problem: Pain:  Goal: Pain level will decrease  Description: Pain level will decrease  Outcome: Met This Shift     Problem: Fluid Volume - Imbalance:  Goal: Absence of postpartum hemorrhage signs and symptoms  Description: Absence of postpartum hemorrhage signs and symptoms  Outcome: Met This Shift     Problem: Fluid Volume - Imbalance:  Goal: Absence of imbalanced fluid volume signs and symptoms  Description: Absence of imbalanced fluid volume signs and symptoms  Outcome: Met This Shift     Problem: Infection - Surgical Site:  Goal: Will show no infection signs and symptoms  Description: Will show no infection signs and symptoms  Outcome: Met This Shift     Problem: Mood - Altered:  Goal: Mood stable  Description: Mood stable  Outcome: Met This Shift     Problem: Nausea/Vomiting:  Goal: Absence of nausea/vomiting  Description: Absence of nausea/vomiting  Outcome: Met This Shift     Problem: Pain - Acute:  Goal: Pain level will decrease  Description: Pain level will decrease  Outcome: Met This Shift     Problem: Urinary Retention:  Goal: Urinary elimination within specified parameters  Description: Urinary elimination within specified parameters  Outcome: Met This Shift     Problem: Venous Thromboembolism:  Goal: Will show no signs or symptoms of venous thromboembolism  Description: Will show no signs or symptoms of venous thromboembolism  Outcome: Met This Shift     Problem: Venous Thromboembolism:  Goal: Absence of signs or symptoms of impaired coagulation  Description: Absence of signs or symptoms of impaired coagulation  Outcome: Met This Shift

## 2022-01-08 NOTE — PROGRESS NOTES
Subjective:     Postpartum Day 2:  Delivery    The patient feels well. Pain is well controlled with current medications. Baby is feeding via bottle - Similac with iron. Urinary output is adequate. The patient is ambulating well. The patient is tolerating a normal diet. Flatus has been passed. Objective:        Vitals:    22 0717   BP: (!) 144/85   Pulse: 82   Resp: 18   Temp: 97.9 °F (36.6 °C)   SpO2: 98%         Intake/Output Summary (Last 24 hours) at 2022 1147  Last data filed at 2022 0845  Gross per 24 hour   Intake 310 ml   Output --   Net 310 ml     Lab Results   Component Value Date    WBC 10.1 2022    HGB 7.7 (L) 2022    HCT 23.5 (L) 2022    MCV 91.1 2022     2022       General:    alert, appears stated age and cooperative       Lochia:  appropriate   Uterine    firm   Incision:  dressing intact, no drainage   DVT Evaluation:  No evidence of DVT seen on physical exam.  Calf/Ankle edema is present. Assessment:     Status post  section. Doing well postoperatively. Postoperative course complicated by acute blood loss anemia, rectus sheath hematoma     Plan:     Discharge home with standard precautions and return to clinic in 1 week.

## 2022-02-07 NOTE — DISCHARGE SUMMARY
Obstetric Discharge Summary    Admitting Diagnosis  IUP 37+ weeks weeks  OB History        1    Para   1    Term   1            AB        Living   1       SAB        IAB        Ectopic        Molar        Multiple   0    Live Births   1                Reasons for Admission on 2022 10:24 AM  37 weeks gestation of pregnancy [Z3A.37]  No comment available  Induction of Labor   Section (Primary)    Prenatal Procedures  None    Intrapartum Procedures                  Delivery: : Low Cervical, Transverse       Postpartum Procedures  None    Postpartum/Operative Complications       [place] Data  Information for the patient's :  David Rea [[id number]]   male   Birth Weight: 6 lb 15.5 oz (3.16 kg)     Discharge With Mother  Complications: No    Discharge Diagnosis       Discharge Information  Discharge Medication List as of 2022  1:00 PM      START taking these medications    Details   oxyCODONE-acetaminophen (PERCOCET) 5-325 MG per tablet Take 2 tablets by mouth every 4 hours as needed for Pain for up to 5 days. , Disp-20 tablet, R-0Normal      ibuprofen (ADVIL;MOTRIN) 800 MG tablet Take 1 tablet by mouth every 8 hours, Disp-120 tablet, R-3Normal      ferrous sulfate (IRON 325) 325 (65 Fe) MG tablet Take 1 tablet by mouth 2 times daily (with meals), Disp-30 tablet, R-3Normal         CONTINUE these medications which have NOT CHANGED    Details   labetalol (NORMODYNE) 200 MG tablet Take 1 tablet by mouth 3 times daily, Disp-90 tablet, R-3Normal      Prenat-FeAsp-Meth-FA-DHA w/o A (PRENATE DHA) 18-0.6-0.4-300 MG CAPS Take 1 tablet by mouth daily, Disp-30 capsule, R-11Normal         STOP taking these medications       aspirin 81 MG chewable tablet Comments:   Reason for Stopping:               No discharge procedures on file.     Discharge to: Home  Stable condition    Comments

## 2022-02-07 NOTE — H&P
CHIEF COMPLAINT:  elevated blood pressure, induction of labor    HISTORY OF PRESENT ILLNESS:      The patient is a 28 y.o. female at 37w0d. OB History        1    Para   1    Term   1            AB        Living   1       SAB        IAB        Ectopic        Molar        Multiple   0    Live Births   1            Patient presents with a chief complaint as above and is being admitted for pre-eclampsia    Estimated Due Date: Estimated Date of Delivery: 22    PRENATAL CARE:    Complicated by: gestational hypertension    PAST OB HISTORY  OB History        1    Para   1    Term   1            AB        Living   1       SAB        IAB        Ectopic        Molar        Multiple   0    Live Births   1                Past Medical History:        Diagnosis Date    Acute blood loss anemia 2022    Hypertension     Gestational hypertension     Past Surgical History:        Procedure Laterality Date     SECTION N/A 2022     SECTION performed by Ngozi [facility]b DO at [facility]    TONSILLECTOMY      WISDOM TOOTH EXTRACTION       Allergies:  Patient has no known allergies.   Social History:    Social History     Socioeconomic History    Marital status: Single     Spouse name: Not on file    Number of children: Not on file    Years of education: Not on file    Highest education level: Not on file   Occupational History    Not on file   Tobacco Use    Smoking status: Former Smoker     Packs/day: 0.50     Quit date: 2021     Years since quittin.3    Smokeless tobacco: Never Used   Vaping Use    Vaping Use: Every day    Substances: Nicotine, Flavoring   Substance and Sexual Activity    Alcohol use: No    Drug use: Not Currently     Types: Marijuana Drema Marts)     Comment: quit 3 weeks ago    Sexual activity: Yes   Other Topics Concern    Not on file   Social History Narrative    Not on file     Social Determinants of Health     Financial Resource Strain:     Difficulty of Paying Living Expenses: Not on file   Food Insecurity:     Worried About Running Out of Food in the Last Year: Not on file    Jonny of Food in the Last Year: Not on file   Transportation Needs:     Lack of Transportation (Medical): Not on file    Lack of Transportation (Non-Medical): Not on file   Physical Activity:     Days of Exercise per Week: Not on file    Minutes of Exercise per Session: Not on file   Stress:     Feeling of Stress : Not on file   Social Connections:     Frequency of Communication with Friends and Family: Not on file    Frequency of Social Gatherings with Friends and Family: Not on file    Attends [employer] Services: Not on file    Active Member of [address] [place] [address] [employer] Organizations: Not on file    Attends Club or Organization Meetings: Not on file    Marital Status: Not on file   Intimate Partner Violence:     Fear of Current or Ex-Partner: Not on file    Emotionally Abused: Not on file    Physically Abused: Not on file    Sexually Abused: Not on file   Housing Stability:     Unable to Pay for Housing in the Last Year: Not on file    Number of [place] in the Last Year: Not on file    Unstable Housing in the Last Year: Not on file     Family History:       Problem Relation Age of Onset    Cancer Father         mesothelioma    Spont Abortions Father      Medications Prior to Admission:  No medications prior to admission.     REVIEW OF SYSTEMS:    CONSTITUTIONAL:  negative  RESPIRATORY:  negative  CARDIOVASCULAR:  negative  GASTROINTESTINAL:  negative  ALLERGIC/IMMUNOLOGIC:  negative  NEUROLOGICAL:  negative  BEHAVIOR/PSYCH:  negative    PHYSICAL EXAM:  Vitals:    01/07/22 1605 01/07/22 1652 01/07/22 2305 01/08/22 0717   BP: (!) 146/89 (!) 152/92 (!) 146/76 (!) 144/85   Pulse: 88 89 89 82   Resp: 18  16 18   Temp: 98.6 °F (37 °C)  98.3 °F (36.8 °C) 97.9 °F (36.6 °C)   TempSrc: Oral  Oral Oral   SpO2: 100%  98% 98%   Weight:       Height:         General appearance:  awake, alert, cooperative, no apparent distress, and appears stated age  Neurologic:  Awake, alert, oriented to name, place and time. Lungs:  No increased work of breathing, good air exchange  Abdomen:  Soft, non tender, gravid, consistent with her gestational age  Fetal heart rate:  Reassuring.   Pelvis:  Adequate pelvis    Membranes:  Intact    ASSESSMENT AND PLAN:    Labor: Admit, anticipate normal delivery, routine labor orders  Fetus: Reassuring  GBS: na  Other: plan cytotec for cervical ripening and labor induction,

## 2024-10-09 ENCOUNTER — APPOINTMENT (OUTPATIENT)
Dept: CT IMAGING | Age: 34
End: 2024-10-09
Payer: MEDICAID

## 2024-10-09 ENCOUNTER — HOSPITAL ENCOUNTER (EMERGENCY)
Age: 34
Discharge: HOME | End: 2024-10-09
Attending: EMERGENCY MEDICINE
Payer: MEDICAID

## 2024-10-09 VITALS
HEIGHT: 67 IN | HEART RATE: 94 BPM | OXYGEN SATURATION: 97 % | RESPIRATION RATE: 14 BRPM | BODY MASS INDEX: 28.25 KG/M2 | TEMPERATURE: 98.6 F | SYSTOLIC BLOOD PRESSURE: 144 MMHG | WEIGHT: 180 LBS | DIASTOLIC BLOOD PRESSURE: 93 MMHG

## 2024-10-09 DIAGNOSIS — L03.211 CELLULITIS OF FACE: Primary | ICD-10-CM

## 2024-10-09 LAB
ANION GAP SERPL CALCULATED.3IONS-SCNC: 11 MMOL/L (ref 7–16)
BASOPHILS # BLD: 0.02 K/UL (ref 0–0.2)
BASOPHILS NFR BLD: 0 % (ref 0–2)
BUN SERPL-MCNC: 10 MG/DL (ref 6–20)
CALCIUM SERPL-MCNC: 9.4 MG/DL (ref 8.6–10.2)
CHLORIDE SERPL-SCNC: 104 MMOL/L (ref 98–107)
CO2 SERPL-SCNC: 23 MMOL/L (ref 22–29)
CREAT SERPL-MCNC: 0.9 MG/DL (ref 0.5–1)
EOSINOPHIL # BLD: 0 K/UL (ref 0.05–0.5)
EOSINOPHILS RELATIVE PERCENT: 0 % (ref 0–6)
ERYTHROCYTE [DISTWIDTH] IN BLOOD BY AUTOMATED COUNT: 12.3 % (ref 11.5–15)
GFR, ESTIMATED: 87 ML/MIN/1.73M2
GLUCOSE SERPL-MCNC: 167 MG/DL (ref 74–99)
HCT VFR BLD AUTO: 37.3 % (ref 34–48)
HGB BLD-MCNC: 12.7 G/DL (ref 11.5–15.5)
IMM GRANULOCYTES # BLD AUTO: 0.06 K/UL (ref 0–0.58)
IMM GRANULOCYTES NFR BLD: 0 % (ref 0–5)
INR PPP: 1.1
LYMPHOCYTES NFR BLD: 0.88 K/UL (ref 1.5–4)
LYMPHOCYTES RELATIVE PERCENT: 6 % (ref 20–42)
MAGNESIUM SERPL-MCNC: 2.1 MG/DL (ref 1.6–2.6)
MCH RBC QN AUTO: 31.1 PG (ref 26–35)
MCHC RBC AUTO-ENTMCNC: 34 G/DL (ref 32–34.5)
MCV RBC AUTO: 91.2 FL (ref 80–99.9)
MONOCYTES NFR BLD: 0.22 K/UL (ref 0.1–0.95)
MONOCYTES NFR BLD: 2 % (ref 2–12)
NEUTROPHILS NFR BLD: 92 % (ref 43–80)
NEUTS SEG NFR BLD: 13.05 K/UL (ref 1.8–7.3)
PLATELET # BLD AUTO: 269 K/UL (ref 130–450)
PMV BLD AUTO: 11.7 FL (ref 7–12)
POTASSIUM SERPL-SCNC: 4.2 MMOL/L (ref 3.5–5)
PROTHROMBIN TIME: 11 SEC (ref 9.3–12.4)
RBC # BLD AUTO: 4.09 M/UL (ref 3.5–5.5)
SODIUM SERPL-SCNC: 138 MMOL/L (ref 132–146)
WBC OTHER # BLD: 14.2 K/UL (ref 4.5–11.5)

## 2024-10-09 PROCEDURE — 85025 COMPLETE CBC W/AUTO DIFF WBC: CPT

## 2024-10-09 PROCEDURE — 96365 THER/PROPH/DIAG IV INF INIT: CPT

## 2024-10-09 PROCEDURE — 2580000003 HC RX 258

## 2024-10-09 PROCEDURE — 80048 BASIC METABOLIC PNL TOTAL CA: CPT

## 2024-10-09 PROCEDURE — 6360000002 HC RX W HCPCS

## 2024-10-09 PROCEDURE — 99285 EMERGENCY DEPT VISIT HI MDM: CPT

## 2024-10-09 PROCEDURE — 85610 PROTHROMBIN TIME: CPT

## 2024-10-09 PROCEDURE — 70491 CT SOFT TISSUE NECK W/DYE: CPT

## 2024-10-09 PROCEDURE — 83735 ASSAY OF MAGNESIUM: CPT

## 2024-10-09 PROCEDURE — 6360000004 HC RX CONTRAST MEDICATION: Performed by: RADIOLOGY

## 2024-10-09 RX ORDER — IOPAMIDOL 755 MG/ML
75 INJECTION, SOLUTION INTRAVASCULAR
Status: COMPLETED | OUTPATIENT
Start: 2024-10-09 | End: 2024-10-09

## 2024-10-09 RX ORDER — SULFAMETHOXAZOLE/TRIMETHOPRIM 800-160 MG
1 TABLET ORAL 2 TIMES DAILY
Qty: 14 TABLET | Refills: 0 | Status: SHIPPED | OUTPATIENT
Start: 2024-10-09 | End: 2024-10-16

## 2024-10-09 RX ORDER — FLUCONAZOLE 150 MG/1
150 TABLET ORAL
Qty: 2 TABLET | Refills: 0 | Status: SHIPPED | OUTPATIENT
Start: 2024-10-09 | End: 2024-10-15

## 2024-10-09 RX ADMIN — AMPICILLIN SODIUM AND SULBACTAM SODIUM 3000 MG: 2; 1 INJECTION, POWDER, FOR SOLUTION INTRAMUSCULAR; INTRAVENOUS at 21:22

## 2024-10-09 RX ADMIN — IOPAMIDOL 75 ML: 755 INJECTION, SOLUTION INTRAVENOUS at 19:27

## 2024-10-09 ASSESSMENT — PAIN DESCRIPTION - LOCATION: LOCATION: EYE

## 2024-10-09 ASSESSMENT — PAIN DESCRIPTION - ORIENTATION: ORIENTATION: LEFT

## 2024-10-09 ASSESSMENT — PAIN SCALES - GENERAL: PAINLEVEL_OUTOF10: 8

## 2024-10-09 ASSESSMENT — PAIN - FUNCTIONAL ASSESSMENT: PAIN_FUNCTIONAL_ASSESSMENT: 0-10

## 2024-10-09 NOTE — ED NOTES
Radiology Procedure Waiver   Name: Jessi Alfaro  : 1990  MRN: [id number]    Date:  10/9/24    Time: 6:37 PM EDT    Benefits of immediately proceeding with Radiology exam(s) without pre-testing outweigh the risks or are not indicated as specified below and therefore the following is/are being waived:    [] Pregnancy test   [x] Patients LMP on-time and regular.   [] Patient had Tubal Ligation or has other Contraception Device.   [] Patient  is Menopausal or Premenarcheal.    [] Patient had Full or Partial Hysterectomy.    [] Protocol for Iodine allergy    [] MRI Questionnaire     [] BUN/Creatinine   [x] Patient age w/no hx of renal dysfunction.   [] Patient on Dialysis.   [] Recent Normal Labs.  Electronically signed by Hans Gamboa MD on 10/9/24 at 6:37 PM EDT               Hans Gamboa MD  10/09/24 [id number]

## 2024-10-09 NOTE — ED PROVIDER NOTES
[facility] EMERGENCY DEPARTMENT  EMERGENCY DEPARTMENT ENCOUNTER        Pt Name: Jessi Alfaro  MRN: [id number]  Birthdate 1990  Date of evaluation: 10/9/2024  Provider: Dorene Delaney MD  PCP: Zina Cabrera APRN - CNP  Note Started: 5:22 PM EDT 10/9/24    CHIEF COMPLAINT       Chief Complaint   Patient presents with    Eye Problem     Right eye swelling extend to underneath of the right eye and into the right upper check patients states that she had a boil on face and it needed drained, sent in by  to have it drained. Vision intake per patient        HISTORY OF PRESENT ILLNESS: 1 or more Elements   History From: Patient    Limitations to history : None    Jessi Alfaro is a 34 y.o. female who presents for facial swelling beginning 3 days ago.  Patient states that she initially noticed a boil to her left upper cheek about a few days ago.  States the area began to swell the last 3 days and is now involving her lower eyelid.  She went to urgent care a couple days ago and was prescribed Keflex.  Patient states she started taking it yesterday.  Patient reports going to [facility] today who recommended patient to this ED to have the \"proper person drain it.\"  She denies any fever or chills.  No blurry vision.  States swelling seems to have slightly improved. She notes that she has had a history of 2 other boils in the past that required drainage but never on her face.  No other complaints at this time.  NKDA.    Patient denies fever, chills, headache, shortness of breath, chest pain, abdominal pain, nausea, vomiting, diarrhea, lightheadedness, dysuria, hematuria, hematochezia, and melena.    Nursing Notes were all reviewed and agreed with or any disagreements were addressed in the HPI.        REVIEW OF EXTERNAL NOTES :            REVIEW OF SYSTEMS :           Positives and Pertinent negatives as per HPI.     SURGICAL HISTORY     Past Surgical

## 2024-10-10 NOTE — DISCHARGE INSTRUCTIONS
Please follow-up with your primary care physician.   prescriptions.  Return to the ED if symptoms worsen.

## (undated) DEVICE — SPONGE LAP W18XL18IN WHT COT 4 PLY FLD STRUNG RADPQ DISP ST

## (undated) DEVICE — GLOVE SURG SZ 65 L12IN FNGR THK83MIL CRM POLYISOPRENE

## (undated) DEVICE — PEN: MARKING STD 100/CS: Brand: MEDICAL ACTION INDUSTRIES

## (undated) DEVICE — 3M™ STERI-STRIP™ COMPOUND BENZOIN TINCTURE 40 BAGS/CARTON 4 CARTONS/CASE C1544: Brand: 3M™ STERI-STRIP™

## (undated) DEVICE — Device: Brand: PORTEX

## (undated) DEVICE — TUBE BLD COLLECT ST 1 SIL COAT 7ML 10ML

## (undated) DEVICE — HYPODERMIC SAFETY NEEDLE: Brand: MAGELLAN

## (undated) DEVICE — SUTURE ABSORBABLE MONOFILAMENT 0 TP1 60 IN VIO PDS + PDP991G

## (undated) DEVICE — CONTAINER,SPEC,PNEUM TUBE,3OZ,STRL PATH: Brand: MEDLINE

## (undated) DEVICE — ELECTRODE PT RET AD L9FT HI MOIST COND ADH HYDRGEL CORDED

## (undated) DEVICE — MEDI-VAC YANKAUER SUCTION HANDLE W/BULBOUS TIP: Brand: CARDINAL HEALTH

## (undated) DEVICE — STAPLER SKIN SQ 30 ABSRB STPL DISP INSORB

## (undated) DEVICE — CATHETERIZATION KIT FOL16 FR 2000 CC DRAINAGE BG LUBRICATH

## (undated) DEVICE — COUNTER NDL 30 COUNT DBL MAG

## (undated) DEVICE — TUBING, SUCTION, 3/16" X 12', STRAIGHT: Brand: MEDLINE

## (undated) DEVICE — PENCIL ES L3M BTTN SWCH HOLSTER W/ BLDE ELECTRD EDGE

## (undated) DEVICE — TOWEL,OR,DSP,ST,BLUE,STD,6/PK,12PK/CS: Brand: MEDLINE

## (undated) DEVICE — BLADE SURG NO20 S STL STR DISP GLASSVAN

## (undated) DEVICE — SUTURE VCRL + SZ 0 L36IN ABSRB VLT L36MM CT-1 1/2 CIR VCP346H

## (undated) DEVICE — Z DISCONTINUED USE 2275676 GLOVE SURG SZ 65 L12IN FNGR THK87MIL DK GRN LTX FREE ISOLEX

## (undated) DEVICE — COVER,LIGHT HANDLE,FLX,2/PK: Brand: MEDLINE INDUSTRIES, INC.

## (undated) DEVICE — CESAREAN BIRTH PACK II: Brand: MEDLINE INDUSTRIES, INC.

## (undated) DEVICE — GOWN,SIRUS,FABRNF,L,20/CS: Brand: MEDLINE

## (undated) DEVICE — APPLICATOR PREP 26ML 0.7% IOD POVACRYLEX 74% ISO ALC ST

## (undated) DEVICE — DRESSING FOAM POST OPERATIVE 4X10 IN MEPILEX BORDER AG

## (undated) DEVICE — 3000CC GUARDIAN II: Brand: GUARDIAN

## (undated) DEVICE — STRIP,CLOSURE,WOUND,MEDI-STRIP,1/2X4: Brand: MEDLINE

## (undated) DEVICE — CONTAINER SPEC 64OZ POLYPR PATH SNAP LOK CAP W/ LID

## (undated) DEVICE — SHEET,DRAPE,53X77,STERILE: Brand: MEDLINE